# Patient Record
Sex: FEMALE | Race: BLACK OR AFRICAN AMERICAN | NOT HISPANIC OR LATINO | Employment: FULL TIME | RURAL
[De-identification: names, ages, dates, MRNs, and addresses within clinical notes are randomized per-mention and may not be internally consistent; named-entity substitution may affect disease eponyms.]

---

## 2020-07-27 ENCOUNTER — TELEPHONE (OUTPATIENT)
Dept: FAMILY MEDICINE CLINIC | Facility: CLINIC | Age: 25
End: 2020-07-27

## 2020-07-27 NOTE — TELEPHONE ENCOUNTER
CALLED PATIENT TO R/S JOSÉ LUIS IS OUT NEED TO R/S CAN SCHEDULE WITH [facility] FOR NEXT WEEK  HUB CAN READ

## 2020-08-18 ENCOUNTER — OFFICE VISIT (OUTPATIENT)
Dept: FAMILY MEDICINE CLINIC | Facility: CLINIC | Age: 25
End: 2020-08-18

## 2020-08-18 ENCOUNTER — LAB (OUTPATIENT)
Dept: LAB | Facility: HOSPITAL | Age: 25
End: 2020-08-18

## 2020-08-18 VITALS
SYSTOLIC BLOOD PRESSURE: 123 MMHG | HEIGHT: 67 IN | WEIGHT: 279 LBS | HEART RATE: 76 BPM | RESPIRATION RATE: 20 BRPM | OXYGEN SATURATION: 98 % | BODY MASS INDEX: 43.79 KG/M2 | DIASTOLIC BLOOD PRESSURE: 71 MMHG | TEMPERATURE: 98 F

## 2020-08-18 DIAGNOSIS — Z13.21 ENCOUNTER FOR VITAMIN DEFICIENCY SCREENING: ICD-10-CM

## 2020-08-18 DIAGNOSIS — Z13.1 SCREENING FOR DIABETES MELLITUS: ICD-10-CM

## 2020-08-18 DIAGNOSIS — L02.91 ABSCESS: Primary | ICD-10-CM

## 2020-08-18 DIAGNOSIS — Z13.29 SCREENING FOR THYROID DISORDER: ICD-10-CM

## 2020-08-18 PROCEDURE — 99213 OFFICE O/P EST LOW 20 MIN: CPT | Performed by: NURSE PRACTITIONER

## 2020-08-18 PROCEDURE — 84443 ASSAY THYROID STIM HORMONE: CPT | Performed by: NURSE PRACTITIONER

## 2020-08-18 PROCEDURE — 82306 VITAMIN D 25 HYDROXY: CPT | Performed by: NURSE PRACTITIONER

## 2020-08-18 PROCEDURE — 80053 COMPREHEN METABOLIC PANEL: CPT | Performed by: NURSE PRACTITIONER

## 2020-08-18 RX ORDER — SULFAMETHOXAZOLE AND TRIMETHOPRIM 800; 160 MG/1; MG/1
1 TABLET ORAL 2 TIMES DAILY
Qty: 20 TABLET | Refills: 0 | Status: SHIPPED | OUTPATIENT
Start: 2020-08-18 | End: 2020-09-10

## 2020-08-18 NOTE — PATIENT INSTRUCTIONS
Calorie Counting for Weight Loss  Calories are units of energy. Your body needs a certain amount of calories from food to keep you going throughout the day. When you eat more calories than your body needs, your body stores the extra calories as fat. When you eat fewer calories than your body needs, your body burns fat to get the energy it needs.  Calorie counting means keeping track of how many calories you eat and drink each day. Calorie counting can be helpful if you need to lose weight. If you make sure to eat fewer calories than your body needs, you should lose weight. Ask your health care provider what a healthy weight is for you.  For calorie counting to work, you will need to eat the right number of calories in a day in order to lose a healthy amount of weight per week. A dietitian can help you determine how many calories you need in a day and will give you suggestions on how to reach your calorie goal.  · A healthy amount of weight to lose per week is usually 1-2 lb (0.5-0.9 kg). This usually means that your daily calorie intake should be reduced by 500-750 calories.  · Eating 1,200 - 1,500 calories per day can help most women lose weight.  · Eating 1,500 - 1,800 calories per day can help most men lose weight.  What is my plan?  My goal is to have __________ calories per day.  If I have this many calories per day, I should lose around __________ pounds per week.  What do I need to know about calorie counting?  In order to meet your daily calorie goal, you will need to:  · Find out how many calories are in each food you would like to eat. Try to do this before you eat.  · Decide how much of the food you plan to eat.  · Write down what you ate and how many calories it had. Doing this is called keeping a food log.  To successfully lose weight, it is important to balance calorie counting with a healthy lifestyle that includes regular activity. Aim for 150 minutes of moderate exercise (such as walking) or 75  minutes of vigorous exercise (such as running) each week.  Where do I find calorie information?    The number of calories in a food can be found on a Nutrition Facts label. If a food does not have a Nutrition Facts label, try to look up the calories online or ask your dietitian for help.  Remember that calories are listed per serving. If you choose to have more than one serving of a food, you will have to multiply the calories per serving by the amount of servings you plan to eat. For example, the label on a package of bread might say that a serving size is 1 slice and that there are 90 calories in a serving. If you eat 1 slice, you will have eaten 90 calories. If you eat 2 slices, you will have eaten 180 calories.  How do I keep a food log?  Immediately after each meal, record the following information in your food log:  · What you ate. Don't forget to include toppings, sauces, and other extras on the food.  · How much you ate. This can be measured in cups, ounces, or number of items.  · How many calories each food and drink had.  · The total number of calories in the meal.  Keep your food log near you, such as in a small notebook in your pocket, or use a mobile justine or website. Some programs will calculate calories for you and show you how many calories you have left for the day to meet your goal.  What are some calorie counting tips?    · Use your calories on foods and drinks that will fill you up and not leave you hungry:  ? Some examples of foods that fill you up are nuts and nut butters, vegetables, lean proteins, and high-fiber foods like whole grains. High-fiber foods are foods with more than 5 g fiber per serving.  ? Drinks such as sodas, specialty coffee drinks, alcohol, and juices have a lot of calories, yet do not fill you up.  · Eat nutritious foods and avoid empty calories. Empty calories are calories you get from foods or beverages that do not have many vitamins or protein, such as candy, sweets, and  "soda. It is better to have a nutritious high-calorie food (such as an avocado) than a food with few nutrients (such as a bag of chips).  · Know how many calories are in the foods you eat most often. This will help you calculate calorie counts faster.  · Pay attention to calories in drinks. Low-calorie drinks include water and unsweetened drinks.  · Pay attention to nutrition labels for \"low fat\" or \"fat free\" foods. These foods sometimes have the same amount of calories or more calories than the full fat versions. They also often have added sugar, starch, or salt, to make up for flavor that was removed with the fat.  · Find a way of tracking calories that works for you. Get creative. Try different apps or programs if writing down calories does not work for you.  What are some portion control tips?  · Know how many calories are in a serving. This will help you know how many servings of a certain food you can have.  · Use a measuring cup to measure serving sizes. You could also try weighing out portions on a kitchen scale. With time, you will be able to estimate serving sizes for some foods.  · Take some time to put servings of different foods on your favorite plates, bowls, and cups so you know what a serving looks like.  · Try not to eat straight from a bag or box. Doing this can lead to overeating. Put the amount you would like to eat in a cup or on a plate to make sure you are eating the right portion.  · Use smaller plates, glasses, and bowls to prevent overeating.  · Try not to multitask (for example, watch TV or use your computer) while eating. If it is time to eat, sit down at a table and enjoy your food. This will help you to know when you are full. It will also help you to be aware of what you are eating and how much you are eating.  What are tips for following this plan?  Reading food labels  · Check the calorie count compared to the serving size. The serving size may be smaller than what you are used to " "eating.  · Check the source of the calories. Make sure the food you are eating is high in vitamins and protein and low in saturated and trans fats.  Shopping  · Read nutrition labels while you shop. This will help you make healthy decisions before you decide to purchase your food.  · Make a grocery list and stick to it.  Cooking  · Try to cook your favorite foods in a healthier way. For example, try baking instead of frying.  · Use low-fat dairy products.  Meal planning  · Use more fruits and vegetables. Half of your plate should be fruits and vegetables.  · Include lean proteins like poultry and fish.  How do I count calories when eating out?  · Ask for smaller portion sizes.  · Consider sharing an entree and sides instead of getting your own entree.  · If you get your own entree, eat only half. Ask for a box at the beginning of your meal and put the rest of your entree in it so you are not tempted to eat it.  · If calories are listed on the menu, choose the lower calorie options.  · Choose dishes that include vegetables, fruits, whole grains, low-fat dairy products, and lean protein.  · Choose items that are boiled, broiled, grilled, or steamed. Stay away from items that are buttered, battered, fried, or served with cream sauce. Items labeled \"crispy\" are usually fried, unless stated otherwise.  · Choose water, low-fat milk, unsweetened iced tea, or other drinks without added sugar. If you want an alcoholic beverage, choose a lower calorie option such as a glass of wine or light beer.  · Ask for dressings, sauces, and syrups on the side. These are usually high in calories, so you should limit the amount you eat.  · If you want a salad, choose a garden salad and ask for grilled meats. Avoid extra toppings like mae, cheese, or fried items. Ask for the dressing on the side, or ask for olive oil and vinegar or lemon to use as dressing.  · Estimate how many servings of a food you are given. For example, a serving of " cooked rice is ½ cup or about the size of half a baseball. Knowing serving sizes will help you be aware of how much food you are eating at restaurants. The list below tells you how big or small some common portion sizes are based on everyday objects:  ? 1 oz--4 stacked dice.  ? 3 oz--1 deck of cards.  ? 1 tsp--1 die.  ? 1 Tbsp--½ a ping-pong ball.  ? 2 Tbsp--1 ping-pong ball.  ? ½ cup--½ baseball.  ? 1 cup--1 baseball.  Summary  · Calorie counting means keeping track of how many calories you eat and drink each day. If you eat fewer calories than your body needs, you should lose weight.  · A healthy amount of weight to lose per week is usually 1-2 lb (0.5-0.9 kg). This usually means reducing your daily calorie intake by 500-750 calories.  · The number of calories in a food can be found on a Nutrition Facts label. If a food does not have a Nutrition Facts label, try to look up the calories online or ask your dietitian for help.  · Use your calories on foods and drinks that will fill you up, and not on foods and drinks that will leave you hungry.  · Use smaller plates, glasses, and bowls to prevent overeating.  This information is not intended to replace advice given to you by your health care provider. Make sure you discuss any questions you have with your health care provider.  Document Released: 12/18/2006 Document Revised: 09/06/2019 Document Reviewed: 11/17/2017  [employer] Patient Education © 2020 [employer]      Exercising to Lose Weight  Exercise is structured, repetitive physical activity to improve fitness and health. Getting regular exercise is important for everyone. It is especially important if you are overweight. Being overweight increases your risk of heart disease, stroke, diabetes, high blood pressure, and several types of cancer. Reducing your calorie intake and exercising can help you lose weight.  Exercise is usually categorized as moderate or vigorous intensity. To lose weight, most people need  to do a certain amount of moderate-intensity or vigorous-intensity exercise each week.  Moderate-intensity exercise    Moderate-intensity exercise is any activity that gets you moving enough to burn at least three times more energy (calories) than if you were sitting.  Examples of moderate exercise include:  · Walking a mile in 15 minutes.  · Doing light yard work.  · Biking at an easy pace.  Most people should get at least 150 minutes (2 hours and 30 minutes) a week of moderate-intensity exercise to maintain their body weight.  Vigorous-intensity exercise  Vigorous-intensity exercise is any activity that gets you moving enough to burn at least six times more calories than if you were sitting. When you exercise at this intensity, you should be working hard enough that you are not able to carry on a conversation.  Examples of vigorous exercise include:  · Running.  · Playing a team sport, such as football, basketball, and soccer.  · Jumping rope.  Most people should get at least 75 minutes (1 hour and 15 minutes) a week of vigorous-intensity exercise to maintain their body weight.  How can exercise affect me?  When you exercise enough to burn more calories than you eat, you lose weight. Exercise also reduces body fat and builds muscle. The more muscle you have, the more calories you burn. Exercise also:  · Improves mood.  · Reduces stress and tension.  · Improves your overall fitness, flexibility, and endurance.  · Increases bone strength.  The amount of exercise you need to lose weight depends on:  · Your age.  · The type of exercise.  · Any health conditions you have.  · Your overall physical ability.  Talk to your health care provider about how much exercise you need and what types of activities are safe for you.  What actions can I take to lose weight?  Nutrition    · Make changes to your diet as told by your health care provider or diet and nutrition specialist (dietitian). This may include:  ? Eating fewer  calories.  ? Eating more protein.  ? Eating less unhealthy fats.  ? Eating a diet that includes fresh fruits and vegetables, whole grains, low-fat dairy products, and lean protein.  ? Avoiding foods with added fat, salt, and sugar.  · Drink plenty of water while you exercise to prevent dehydration or heat stroke.  Activity  · Choose an activity that you enjoy and set realistic goals. Your health care provider can help you make an exercise plan that works for you.  · Exercise at a moderate or vigorous intensity most days of the week.  ? The intensity of exercise may vary from person to person. You can tell how intense a workout is for you by paying attention to your breathing and heartbeat. Most people will notice their breathing and heartbeat get faster with more intense exercise.  · Do resistance training twice each week, such as:  ? Push-ups.  ? Sit-ups.  ? Lifting weights.  ? Using resistance bands.  · Getting short amounts of exercise can be just as helpful as long structured periods of exercise. If you have trouble finding time to exercise, try to include exercise in your daily routine.  ? Get up, stretch, and walk around every 30 minutes throughout the day.  ? Go for a walk during your lunch break.  ? Park your car farther away from your destination.  ? If you take public transportation, get off one stop early and walk the rest of the way.  ? Make phone calls while standing up and walking around.  ? Take the stairs instead of elevators or escalators.  · Wear comfortable clothes and shoes with good support.  · Do not exercise so much that you hurt yourself, feel dizzy, or get very short of breath.  Where to find more information  · [place] Department of Health and Human Services: www.hhs.gov  · Centers for Disease Control and Prevention (CDC): www.cdc.gov  Contact a health care provider:  · Before starting a new exercise program.  · If you have questions or concerns about your weight.  · If you have a medical  problem that keeps you from exercising.  Get help right away if you have any of the following while exercising:  · Injury.  · Dizziness.  · Difficulty breathing or shortness of breath that does not go away when you stop exercising.  · Chest pain.  · Rapid heartbeat.  Summary  · Being overweight increases your risk of heart disease, stroke, diabetes, high blood pressure, and several types of cancer.  · Losing weight happens when you burn more calories than you eat.  · Reducing the amount of calories you eat in addition to getting regular moderate or vigorous exercise each week helps you lose weight.  This information is not intended to replace advice given to you by your health care provider. Make sure you discuss any questions you have with your health care provider.  Document Released: 01/20/2012 Document Revised: 12/31/2018 Document Reviewed: 12/31/2018  [employer] Patient Education © 2020 [employer]

## 2020-08-18 NOTE — PROGRESS NOTES
Subjective   Osiel Camargo is a 25 y.o. female.     Jamal Camargo age 25 comes in today wanting to have a lab work drawn.  She states there is a family history of diabetes and hypertension.  At this time she does not take any medications.  Her last baby was born 1 year ago and her Pap was done at that point it was normal.  She wishes to go back to the OB/GYN to have her Pap smears done.  She also has recurrent boils on her thighs currently she has one on the left thigh.  She is asking for an antibiotic for this.    Rash   This is a recurrent problem. The current episode started in the past 7 days. The problem is unchanged. Location: Left inner thigh. The rash is characterized by redness and pain. She was exposed to nothing. Pertinent negatives include no anorexia, congestion, cough, diarrhea, eye pain, facial edema, fatigue, fever, joint pain, nail changes, rhinorrhea, shortness of breath, sore throat or vomiting. Past treatments include nothing. The treatment provided no relief.        The following portions of the patient's history were reviewed and updated as appropriate: allergies, current medications, past family history, past medical history, past social history, past surgical history and problem list.    Review of Systems   Constitutional: Negative.  Negative for fatigue and fever.   HENT: Negative.  Negative for congestion, rhinorrhea and sore throat.    Eyes: Negative.  Negative for pain.   Respiratory: Negative.  Negative for cough and shortness of breath.    Cardiovascular: Negative.    Gastrointestinal: Negative.  Negative for anorexia, diarrhea and vomiting.   Endocrine: Negative.    Genitourinary: Negative.    Musculoskeletal: Negative.  Negative for joint pain.   Skin: Positive for rash. Negative for nail changes.        Current abscess in the groin and thigh areas   Allergic/Immunologic: Negative.    Neurological: Negative.    Hematological: Negative.    Psychiatric/Behavioral: Negative.         Objective   Physical Exam   Constitutional: She is oriented to person, place, and time. She appears well-developed and well-nourished. No distress.   HENT:   Head: Normocephalic and atraumatic.   Right Ear: External ear normal.   Left Ear: External ear normal.   Nose: Nose normal.   Mouth/Throat: Oropharynx is clear and moist. No oropharyngeal exudate.   Eyes: Pupils are equal, round, and reactive to light.   Neck: Normal range of motion. Neck supple. No thyromegaly present.   Cardiovascular: Normal rate, regular rhythm and normal heart sounds. Exam reveals no friction rub.   No murmur heard.  Pulmonary/Chest: Effort normal and breath sounds normal. No respiratory distress. She has no wheezes. She has no rales.   Abdominal: Soft.   Musculoskeletal: Normal range of motion.   Neurological: She is alert and oriented to person, place, and time.   Skin: Skin is warm and dry.   Third she would not let me look at the abscess today.   Psychiatric: She has a normal mood and affect. Thought content normal.   Nursing note and vitals reviewed.        Assessment/Plan   Osiel was seen today for establish care, anxiety and annual exam.    Diagnoses and all orders for this visit:    Abscess  -     sulfamethoxazole-trimethoprim (BACTRIM DS,SEPTRA DS) 800-160 MG per tablet; Take 1 tablet by mouth 2 (Two) Times a Day.    Encounter for vitamin deficiency screening  -     Vitamin D 25 Hydroxy    Screening for thyroid disorder  -     TSH    Screening for diabetes mellitus  -     Comprehensive metabolic panel      Her BMI is noted to be 43.7 which is considered morbid obesity.  Diet and exercise information will be provided to this patient.

## 2020-08-19 LAB
25(OH)D3 SERPL-MCNC: 14.5 NG/ML (ref 30–100)
ALBUMIN SERPL-MCNC: 4.3 G/DL (ref 3.5–5.2)
ALBUMIN/GLOB SERPL: 1.3 G/DL
ALP SERPL-CCNC: 70 U/L (ref 39–117)
ALT SERPL W P-5'-P-CCNC: 14 U/L (ref 1–33)
ANION GAP SERPL CALCULATED.3IONS-SCNC: 11.8 MMOL/L (ref 5–15)
AST SERPL-CCNC: 15 U/L (ref 1–32)
BILIRUB SERPL-MCNC: 0.2 MG/DL (ref 0–1.2)
BUN SERPL-MCNC: 9 MG/DL (ref 6–20)
BUN/CREAT SERPL: 12 (ref 7–25)
CALCIUM SPEC-SCNC: 9.2 MG/DL (ref 8.6–10.5)
CHLORIDE SERPL-SCNC: 103 MMOL/L (ref 98–107)
CO2 SERPL-SCNC: 25.2 MMOL/L (ref 22–29)
CREAT SERPL-MCNC: 0.75 MG/DL (ref 0.57–1)
GFR SERPL CREATININE-BSD FRML MDRD: 115 ML/MIN/1.73
GLOBULIN UR ELPH-MCNC: 3.4 GM/DL
GLUCOSE SERPL-MCNC: 91 MG/DL (ref 65–99)
POTASSIUM SERPL-SCNC: 4 MMOL/L (ref 3.5–5.2)
PROT SERPL-MCNC: 7.7 G/DL (ref 6–8.5)
SODIUM SERPL-SCNC: 140 MMOL/L (ref 136–145)
TSH SERPL DL<=0.05 MIU/L-ACNC: 0.34 UIU/ML (ref 0.27–4.2)

## 2020-08-20 ENCOUNTER — TELEPHONE (OUTPATIENT)
Dept: FAMILY MEDICINE CLINIC | Facility: CLINIC | Age: 25
End: 2020-08-20

## 2020-08-20 DIAGNOSIS — E55.9 VITAMIN D DEFICIENCY: Primary | ICD-10-CM

## 2020-08-20 RX ORDER — ERGOCALCIFEROL 1.25 MG/1
50000 CAPSULE ORAL WEEKLY
Qty: 5 CAPSULE | Refills: 3 | Status: SHIPPED | OUTPATIENT
Start: 2020-08-20 | End: 2021-02-08 | Stop reason: SDUPTHER

## 2020-08-20 NOTE — PROGRESS NOTES
let her know that everything is normal except for her vitamin D and it is very low she needs to start 50,000 units weekly and we need to recheck in 6 weeks.

## 2020-08-20 NOTE — TELEPHONE ENCOUNTER
----- Message from CAMPBELL Key sent at 8/20/2020  7:51 AM CDT -----  let her know that everything is normal except for her vitamin D and it is very low she needs to start 50,000 units weekly and we need to recheck in 6 weeks.

## 2020-09-04 ENCOUNTER — TELEPHONE (OUTPATIENT)
Dept: FAMILY MEDICINE CLINIC | Facility: CLINIC | Age: 25
End: 2020-09-04

## 2020-09-04 DIAGNOSIS — J01.00 ACUTE MAXILLARY SINUSITIS, RECURRENCE NOT SPECIFIED: Primary | ICD-10-CM

## 2020-09-04 RX ORDER — METHYLPREDNISOLONE 4 MG/1
TABLET ORAL
Qty: 21 EACH | Refills: 0 | Status: SHIPPED | OUTPATIENT
Start: 2020-09-04 | End: 2020-09-10 | Stop reason: ALTCHOICE

## 2020-09-04 RX ORDER — AMOXICILLIN AND CLAVULANATE POTASSIUM 875; 125 MG/1; MG/1
1 TABLET, FILM COATED ORAL 2 TIMES DAILY
Qty: 20 TABLET | Refills: 0 | Status: SHIPPED | OUTPATIENT
Start: 2020-09-04 | End: 2020-10-07

## 2020-09-04 NOTE — TELEPHONE ENCOUNTER
PATIENT STATES SHE IS HAVING SINUS PRESSURE AND IS FEELING LIGHT HEADED STILL SENSE BEING SEEN. PATIENT IS REQUESTING A CALL BACK TO DISCUSS GETTING MEDICATION CALLED IN.

## 2020-09-10 ENCOUNTER — OFFICE VISIT (OUTPATIENT)
Dept: FAMILY MEDICINE CLINIC | Facility: CLINIC | Age: 25
End: 2020-09-10

## 2020-09-10 VITALS
TEMPERATURE: 98.4 F | HEART RATE: 75 BPM | DIASTOLIC BLOOD PRESSURE: 78 MMHG | SYSTOLIC BLOOD PRESSURE: 116 MMHG | HEIGHT: 67 IN | OXYGEN SATURATION: 97 % | WEIGHT: 274.38 LBS | BODY MASS INDEX: 43.07 KG/M2 | RESPIRATION RATE: 20 BRPM

## 2020-09-10 DIAGNOSIS — J01.90 ACUTE SINUSITIS, RECURRENCE NOT SPECIFIED, UNSPECIFIED LOCATION: Primary | ICD-10-CM

## 2020-09-10 DIAGNOSIS — E66.01 CLASS 3 SEVERE OBESITY WITH BODY MASS INDEX (BMI) OF 40.0 TO 44.9 IN ADULT, UNSPECIFIED OBESITY TYPE, UNSPECIFIED WHETHER SERIOUS COMORBIDITY PRESENT (HCC): ICD-10-CM

## 2020-09-10 DIAGNOSIS — J30.2 SEASONAL ALLERGIES: ICD-10-CM

## 2020-09-10 DIAGNOSIS — R06.00 DYSPNEA, UNSPECIFIED TYPE: ICD-10-CM

## 2020-09-10 PROCEDURE — 99214 OFFICE O/P EST MOD 30 MIN: CPT | Performed by: NURSE PRACTITIONER

## 2020-09-10 RX ORDER — FLUTICASONE PROPIONATE 50 MCG
2 SPRAY, SUSPENSION (ML) NASAL DAILY
Qty: 16 G | Refills: 2 | Status: SHIPPED | OUTPATIENT
Start: 2020-09-10 | End: 2020-12-01 | Stop reason: SDUPTHER

## 2020-09-10 RX ORDER — CETIRIZINE HYDROCHLORIDE 10 MG/1
10 TABLET ORAL DAILY
Qty: 30 TABLET | Refills: 2 | Status: SHIPPED | OUTPATIENT
Start: 2020-09-10 | End: 2020-12-01 | Stop reason: SDUPTHER

## 2020-09-10 RX ORDER — FLUCONAZOLE 150 MG/1
TABLET ORAL
Qty: 2 TABLET | Refills: 0 | Status: SHIPPED | OUTPATIENT
Start: 2020-09-10 | End: 2020-10-07

## 2020-09-10 NOTE — PATIENT INSTRUCTIONS
Allergic Rhinitis, Adult  Allergic rhinitis is an allergic reaction that affects the mucous membrane inside the nose. It causes sneezing, a runny or stuffy nose, and the feeling of mucus going down the back of the throat (postnasal drip). Allergic rhinitis can be mild to severe.  There are two types of allergic rhinitis:  · Seasonal. This type is also called hay fever. It happens only during certain seasons.  · Perennial. This type can happen at any time of the year.  What are the causes?  This condition happens when the body's defense system (immune system) responds to certain harmless substances called allergens as though they were germs.   Seasonal allergic rhinitis is triggered by pollen, which can come from grasses, trees, and weeds. Perennial allergic rhinitis may be caused by:  · House dust mites.  · Pet dander.  · Mold spores.  What are the signs or symptoms?  Symptoms of this condition include:  · Sneezing.  · Runny or stuffy nose (nasal congestion).  · Postnasal drip.  · Itchy nose.  · Tearing of the eyes.  · Trouble sleeping.  · Daytime sleepiness.  How is this diagnosed?  This condition may be diagnosed based on:  · Your medical history.  · A physical exam.  · Tests to check for related conditions, such as:  ? Asthma.  ? Pink eye.  ? Ear infection.  ? Upper respiratory infection.  · Tests to find out which allergens trigger your symptoms. These may include skin or blood tests.  How is this treated?  There is no cure for this condition, but treatment can help control symptoms. Treatment may include:  · Taking medicines that block allergy symptoms, such as antihistamines. Medicine may be given as a shot, nasal spray, or pill.  · Avoiding the allergen.  · Desensitization. This treatment involves getting ongoing shots until your body becomes less sensitive to the allergen. This treatment may be done if other treatments do not help.  · If taking medicine and avoiding the allergen does not work, new, stronger  medicines may be prescribed.  Follow these instructions at home:  · Find out what you are allergic to. Common allergens include smoke, dust, and pollen.  · Avoid the things you are allergic to. These are some things you can do to help avoid allergens:  ? Replace carpet with wood, tile, or vinyl aria. Carpet can trap dander and dust.  ? Do not smoke. Do not allow smoking in your home.  ? Change your heating and air conditioning filter at least once a month.  ? During allergy season:  § Keep windows closed as much as possible.  § Plan outdoor activities when pollen counts are lowest. This is usually during the evening hours.  § When coming indoors, change clothing and shower before sitting on furniture or bedding.  · Take over-the-counter and prescription medicines only as told by your health care provider.  · Keep all follow-up visits as told by your health care provider. This is important.  Contact a health care provider if:  · You have a fever.  · You develop a persistent cough.  · You make whistling sounds when you breathe (you wheeze).  · Your symptoms interfere with your normal daily activities.  Get help right away if:  · You have shortness of breath.  Summary  · This condition can be managed by taking medicines as directed and avoiding allergens.  · Contact your health care provider if you develop a persistent cough or fever.  · During allergy season, keep windows closed as much as possible.  This information is not intended to replace advice given to you by your health care provider. Make sure you discuss any questions you have with your health care provider.  Document Released: 09/12/2002 Document Revised: 11/30/2018 Document Reviewed: 01/25/2018  Elsevier Patient Education © 2020 [employer]      Sinusitis, Adult  Sinusitis is inflammation of your sinuses. Sinuses are hollow spaces in the bones around your face. Your sinuses are located:  · Around your eyes.  · In the middle of your  forehead.  · Behind your nose.  · In your cheekbones.  Mucus normally drains out of your sinuses. When your nasal tissues become inflamed or swollen, mucus can become trapped or blocked. This allows bacteria, viruses, and fungi to grow, which leads to infection. Most infections of the sinuses are caused by a virus.  Sinusitis can develop quickly. It can last for up to 4 weeks (acute) or for more than 12 weeks (chronic). Sinusitis often develops after a cold.  What are the causes?  This condition is caused by anything that creates swelling in the sinuses or stops mucus from draining. This includes:  · Allergies.  · Asthma.  · Infection from bacteria or viruses.  · Deformities or blockages in your nose or sinuses.  · Abnormal growths in the nose (nasal polyps).  · Pollutants, such as chemicals or irritants in the air.  · Infection from fungi (rare).  What increases the risk?  You are more likely to develop this condition if you:  · Have a weak body defense system (immune system).  · Do a lot of swimming or diving.  · Overuse nasal sprays.  · Smoke.  What are the signs or symptoms?  The main symptoms of this condition are pain and a feeling of pressure around the affected sinuses. Other symptoms include:  · Stuffy nose or congestion.  · Thick drainage from your nose.  · Swelling and warmth over the affected sinuses.  · Headache.  · Upper toothache.  · A cough that may get worse at night.  · Extra mucus that collects in the throat or the back of the nose (postnasal drip).  · Decreased sense of smell and taste.  · Fatigue.  · A fever.  · Sore throat.  · Bad breath.  How is this diagnosed?  This condition is diagnosed based on:  · Your symptoms.  · Your medical history.  · A physical exam.  · Tests to find out if your condition is acute or chronic. This may include:  ? Checking your nose for nasal polyps.  ? Viewing your sinuses using a device that has a light (endoscope).  ? Testing for allergies or bacteria.  ? Imaging  tests, such as an MRI or CT scan.  In rare cases, a bone biopsy may be done to rule out more serious types of fungal sinus disease.  How is this treated?  Treatment for sinusitis depends on the cause and whether your condition is chronic or acute.  · If caused by a virus, your symptoms should go away on their own within 10 days. You may be given medicines to relieve symptoms. They include:  ? Medicines that shrink swollen nasal passages (topical intranasal decongestants).  ? Medicines that treat allergies (antihistamines).  ? A spray that eases inflammation of the nostrils (topical intranasal corticosteroids).  ? Rinses that help get rid of thick mucus in your nose (nasal saline washes).  · If caused by bacteria, your health care provider may recommend waiting to see if your symptoms improve. Most bacterial infections will get better without antibiotic medicine. You may be given antibiotics if you have:  ? A severe infection.  ? A weak immune system.  · If caused by narrow nasal passages or nasal polyps, you may need to have surgery.  Follow these instructions at home:  Medicines  · Take, use, or apply over-the-counter and prescription medicines only as told by your health care provider. These may include nasal sprays.  · If you were prescribed an antibiotic medicine, take it as told by your health care provider. Do not stop taking the antibiotic even if you start to feel better.  Hydrate and humidify    · Drink enough fluid to keep your urine pale yellow. Staying hydrated will help to thin your mucus.  · Use a cool mist humidifier to keep the humidity level in your home above 50%.  · Inhale steam for 10-15 minutes, 3-4 times a day, or as told by your health care provider. You can do this in the bathroom while a hot shower is running.  · Limit your exposure to cool or dry air.  Rest  · Rest as much as possible.  · Sleep with your head raised (elevated).  · Make sure you get enough sleep each night.  General  instructions    · Apply a warm, moist washcloth to your face 3-4 times a day or as told by your health care provider. This will help with discomfort.  · Wash your hands often with soap and water to reduce your exposure to germs. If soap and water are not available, use hand .  · Do not smoke. Avoid being around people who are smoking (secondhand smoke).  · Keep all follow-up visits as told by your health care provider. This is important.  Contact a health care provider if:  · You have a fever.  · Your symptoms get worse.  · Your symptoms do not improve within 10 days.  Get help right away if:  · You have a severe headache.  · You have persistent vomiting.  · You have severe pain or swelling around your face or eyes.  · You have vision problems.  · You develop confusion.  · Your neck is stiff.  · You have trouble breathing.  Summary  · Sinusitis is soreness and inflammation of your sinuses. Sinuses are hollow spaces in the bones around your face.  · This condition is caused by nasal tissues that become inflamed or swollen. The swelling traps or blocks the flow of mucus. This allows bacteria, viruses, and fungi to grow, which leads to infection.  · If you were prescribed an antibiotic medicine, take it as told by your health care provider. Do not stop taking the antibiotic even if you start to feel better.  · Keep all follow-up visits as told by your health care provider. This is important.  This information is not intended to replace advice given to you by your health care provider. Make sure you discuss any questions you have with your health care provider.  Document Released: 12/18/2006 Document Revised: 05/20/2019 Document Reviewed: 05/20/2019  [employer] Patient Education © 2020 [employer] Inc[employer]

## 2020-09-10 NOTE — PROGRESS NOTES
"Subjective   Osiel Camargo is a 25 y.o. female.     [facility] Same Day Appointment    PCP: CAMPBELL Gilbert    CC: \"sinus infection worse\"    Sinusitis   This is a new problem. Episode onset: x 2-3 weeks. The problem has been waxing and waning since onset. There has been no fever. She is experiencing no pain (mostly pressure). Associated symptoms include congestion, headaches, shortness of breath (at times -- reports lungs feel \"empty\") and sinus pressure (worse with bending forward). Pertinent negatives include no chills, coughing, diaphoresis, ear pain, hoarse voice, neck pain, sneezing, sore throat or swollen glands. Treatments tried: PCP called in Augmentin, Medrol, Claritin D on 9/4--only took 1 sudafed due to side effects; randomly took Medrol, didn't complete; has only taken total of two Augmentin pills, one first day and one yesterday. The treatment provided no relief.        The following portions of the patient's history were reviewed and updated as appropriate: allergies, current medications, past medical history, past social history, past surgical history and problem list.    Review of Systems   Constitutional: Negative for appetite change, chills, diaphoresis and fever.   HENT: Positive for congestion and sinus pressure (worse with bending forward). Negative for ear discharge, ear pain, hoarse voice, postnasal drip, rhinorrhea, sneezing, sore throat, swollen glands and trouble swallowing. Dental problem:  teeth hurt in sinus area bilaterally, worse on right side.    Eyes: Negative.    Respiratory: Positive for shortness of breath (at times -- reports lungs feel \"empty\"). Negative for cough, chest tightness and wheezing.    Cardiovascular: Negative.    Gastrointestinal: Negative.    Genitourinary: Negative for difficulty urinating.   Musculoskeletal: Negative for neck pain.   Skin: Negative.    Neurological: Positive for headache. Negative for dizziness.     /78 (BP Location: Right arm, Patient Position: " "Sitting, Cuff Size: Large Adult)   Pulse 75   Temp 98.4 °F (36.9 °C) (Temporal)   Resp 20   Ht 170.2 cm (67\")   Wt 124 kg (274 lb 6 oz)   SpO2 97%   BMI 42.97 kg/m²     Objective   Physical Exam   Constitutional: She is oriented to person, place, and time. She appears well-developed and well-nourished. No distress.   HENT:   Head: Normocephalic and atraumatic.   Right Ear: Tympanic membrane and ear canal normal.   Left Ear: Tympanic membrane and ear canal normal.   Nose: Mucosal edema (pale, boggy) present. No congestion. Right sinus exhibits maxillary sinus tenderness and frontal sinus tenderness. Left sinus exhibits maxillary sinus tenderness and frontal sinus tenderness.   Mouth/Throat: Uvula is midline, oropharynx is clear and moist and mucous membranes are normal.   Eyes: Conjunctivae are normal. Right eye exhibits no discharge. Left eye exhibits no discharge.   Neck: Neck supple.   Cardiovascular: Normal rate and regular rhythm.   Pulmonary/Chest: Effort normal. She has no wheezes. She has no rales.   Minimally congested cough noted   Lymphadenopathy:     She has no cervical adenopathy.   Neurological: She is alert and oriented to person, place, and time.   Nursing note and vitals reviewed.    No results found for this or any previous visit (from the past 24 hour(s)).  No Images in the past 120 days found..      Assessment/Plan   Osiel was seen today for sinus problem.    Diagnoses and all orders for this visit:    Acute sinusitis, recurrence not specified, unspecified location  -     XR Sinuses 3+ View (In Office)    Dyspnea, unspecified type  -     XR Chest PA & Lateral (In Office)    Seasonal allergies  -     cetirizine (zyrTEC) 10 MG tablet; Take 1 tablet by mouth Daily.  -     fluticasone (Flonase) 50 MCG/ACT nasal spray; 2 sprays into the nostril(s) as directed by provider Daily.    Class 3 severe obesity with body mass index (BMI) of 40.0 to 44.9 in adult, unspecified obesity type, unspecified " whether serious comorbidity present (CMS/HCC)      CXR/Sinus xrays today--will call with results when available    Finish Augmentin to completion--Rx for Diflucan PRN yeast from antibiotics at patient request  D/C Medrol, Claritin D due to side effects not being tolerated by patient  Rx for Zyrtec, Flonase provided   Declines Rocephin or steroid injection at this time  Cool mist humidifier at night recommended    Patient's Body mass index is 42.97 kg/m². BMI is above normal parameters. Recommendations include: referral to primary care.    See PCP or RTC if symptoms persist/worsen  See PCP for routine f/u visit and management of chronic medical conditions      This document has been electronically signed by CAMPBELL Hernadez on September 10, 2020 09:12,.

## 2020-10-07 ENCOUNTER — OFFICE VISIT (OUTPATIENT)
Dept: FAMILY MEDICINE CLINIC | Facility: CLINIC | Age: 25
End: 2020-10-07

## 2020-10-07 ENCOUNTER — LAB (OUTPATIENT)
Dept: LAB | Facility: HOSPITAL | Age: 25
End: 2020-10-07

## 2020-10-07 VITALS
SYSTOLIC BLOOD PRESSURE: 110 MMHG | TEMPERATURE: 97.3 F | DIASTOLIC BLOOD PRESSURE: 70 MMHG | BODY MASS INDEX: 43.85 KG/M2 | HEART RATE: 62 BPM | WEIGHT: 279.38 LBS | OXYGEN SATURATION: 99 % | RESPIRATION RATE: 20 BRPM | HEIGHT: 67 IN

## 2020-10-07 DIAGNOSIS — R53.83 OTHER FATIGUE: ICD-10-CM

## 2020-10-07 DIAGNOSIS — F41.9 ANXIETY: Primary | ICD-10-CM

## 2020-10-07 LAB — HGB BLDA-MCNC: 10.9 G/DL (ref 12–17)

## 2020-10-07 PROCEDURE — 83540 ASSAY OF IRON: CPT | Performed by: NURSE PRACTITIONER

## 2020-10-07 PROCEDURE — 82607 VITAMIN B-12: CPT | Performed by: NURSE PRACTITIONER

## 2020-10-07 PROCEDURE — 82746 ASSAY OF FOLIC ACID SERUM: CPT | Performed by: NURSE PRACTITIONER

## 2020-10-07 PROCEDURE — 85027 COMPLETE CBC AUTOMATED: CPT | Performed by: NURSE PRACTITIONER

## 2020-10-07 PROCEDURE — 85018 HEMOGLOBIN: CPT | Performed by: NURSE PRACTITIONER

## 2020-10-07 PROCEDURE — 99213 OFFICE O/P EST LOW 20 MIN: CPT | Performed by: NURSE PRACTITIONER

## 2020-10-07 PROCEDURE — 84466 ASSAY OF TRANSFERRIN: CPT | Performed by: NURSE PRACTITIONER

## 2020-10-07 RX ORDER — HYDROXYZINE PAMOATE 25 MG/1
25 CAPSULE ORAL 3 TIMES DAILY PRN
Qty: 30 CAPSULE | Refills: 0 | Status: SHIPPED | OUTPATIENT
Start: 2020-10-07 | End: 2021-06-10

## 2020-10-07 NOTE — PROGRESS NOTES
"Subjective   Osiel Camargo is a 25 y.o. female.     [facility] Same Day Appointment    PCP: CAMPBELL Byers    CC: \"anxiety, weak and clammy hands, legs\"    Has had panic attacks on/off since March. Was given Vistaril, but afraid to take.  Was seen at [facility], but only went one time and didn't return again.  Reports she goes to sleep at night, but only sleeps a few hours and then wakes up and can't turn mind off.      Recent labs--CMP, TSH, Vitamin D.  Vitamin D low and taking supplement weekly.  Concerned about anemia, reports iron deficiency during her pregnancies.  Did have  this year in January and lost a lot of blood, but reports no labs were done and she wasn't given any transfusions or iron supplements.     Anxiety  Presents for initial visit. Onset was 1 to 6 months ago. The problem has been waxing and waning. Symptoms include decreased concentration, dizziness, insomnia, malaise, nervous/anxious behavior and panic (reports occasional \"panic attacks\"). Patient reports no chest pain, compulsions, confusion, depressed mood, dry mouth, excessive worry, feeling of choking, hyperventilation, impotence, irritability, muscle tension, nausea, obsessions, palpitations, restlessness, shortness of breath or suicidal ideas. Symptoms occur most days. The severity of symptoms is causing significant distress. Exacerbated by: family stress; concerns over her health. The quality of sleep is non-restorative. Nighttime awakenings: early a.m. for rest of night (usually awake by 2-3 am and can't go back to sleep).     Her past medical history is significant for anemia and anxiety/panic attacks. There is no history of arrhythmia, asthma, bipolar disorder, CAD, CHF, chronic lung disease, depression, fibromyalgia, hyperthyroidism or suicide attempts. Treatments tried: given Hydroxyzine. Compliance with prior treatments has been variable. Prior compliance problems include difficulty with treatment plan. "   Fatigue  Chronicity: persistent. The current episode started more than 1 month ago. The problem occurs daily. The problem has been unchanged. Associated symptoms include fatigue, headaches (daily) and vertigo (off/on). Pertinent negatives include no abdominal pain, anorexia, arthralgias, change in bowel habit, chest pain, chills, congestion, coughing, diaphoresis, fever, joint swelling, myalgias, nausea, neck pain, numbness, rash, sore throat, swollen glands, urinary symptoms, visual change, vomiting or weakness. The symptoms are aggravated by stress. She has tried rest for the symptoms. The treatment provided no relief.        The following portions of the patient's history were reviewed and updated as appropriate: allergies, current medications, past medical history, past social history, past surgical history and problem list.    Review of Systems   Constitutional: Positive for fatigue. Negative for appetite change, chills, diaphoresis, fever and irritability.   HENT: Negative for congestion, sore throat and swollen glands.    Eyes: Negative.    Respiratory: Negative.  Negative for cough and shortness of breath.    Cardiovascular: Negative.  Negative for chest pain and palpitations.   Gastrointestinal: Negative for abdominal pain, anorexia, change in bowel habit, diarrhea, nausea and vomiting.   Genitourinary: Negative for difficulty urinating and impotence.   Musculoskeletal: Negative for arthralgias, joint swelling, myalgias and neck pain.   Skin: Negative for rash.   Neurological: Positive for dizziness, vertigo (off/on) and headache. Negative for weakness, numbness and confusion.   Psychiatric/Behavioral: Positive for decreased concentration, sleep disturbance and stress. Negative for self-injury, suicidal ideas and depressed mood. The patient is nervous/anxious and has insomnia.      /70 (BP Location: Right arm, Patient Position: Sitting, Cuff Size: Large Adult)   Pulse 62   Temp 97.3 °F (36.3 °C)  "(Temporal)   Resp 20   Ht 170.2 cm (67\")   Wt 127 kg (279 lb 6 oz)   LMP 09/07/2020   SpO2 99%   Breastfeeding No   BMI 43.76 kg/m²     Objective   Physical Exam  Vitals signs and nursing note reviewed.   Constitutional:       General: She is not in acute distress.     Appearance: Normal appearance. She is obese.   HENT:      Head: Normocephalic and atraumatic.   Eyes:      General:         Right eye: No discharge.         Left eye: No discharge.      Conjunctiva/sclera: Conjunctivae normal.   Neck:      Musculoskeletal: Normal range of motion and neck supple. No neck rigidity.   Cardiovascular:      Rate and Rhythm: Normal rate and regular rhythm.      Comments: No peripheral edema    Pulmonary:      Effort: Pulmonary effort is normal. No respiratory distress.      Breath sounds: Normal breath sounds. No wheezing, rhonchi or rales.   Lymphadenopathy:      Cervical: No cervical adenopathy.   Skin:     General: Skin is warm and dry.   Neurological:      General: No focal deficit present.      Mental Status: She is alert and oriented to person, place, and time.   Psychiatric:         Mood and Affect: Mood is anxious.         Speech: Speech normal.         Behavior: Behavior normal. Behavior is cooperative.         Thought Content: Thought content normal.         Cognition and Memory: Cognition normal.       Recent Results (from the past 24 hour(s))   POCT hemoglobin    Collection Time: 10/07/20  5:32 PM    Specimen: Blood   Result Value Ref Range    Hemoglobin 10.9 (A) 12.0 - 17.0 g/dL     Xr Sinuses 3+ View (in Office)    Result Date: 9/10/2020  1. Unremarkable exam of the paranasal sinuses. Electronically signed by:  Anh Parker MD  9/10/2020 12:08 PM CDT Workstation: [id number]    Xr Chest Pa & Lateral (in Office)    Result Date: 9/10/2020  1. No radiographic evidence of acute cardiopulmonary disease. Electronically signed by:  Anh Parker MD  9/10/2020 12:07 PM CDT Workstation: " [id number]        Assessment/Plan   Diagnoses and all orders for this visit:    Anxiety  -     hydrOXYzine pamoate (Vistaril) 25 MG capsule; Take 1 capsule by mouth 3 (Three) Times a Day As Needed for Anxiety.    Other fatigue  -     POCT hemoglobin  -     CBC No Differential  -     Iron Profile  -     Vitamin B12  -     Folate      Encouraged to follow back up with [facility] regarding Anxiety--she will schedule her own appt  Does not wish to start on daily medication at this time, agreeable to try the Vistaril PRN -- new Rx given  F/U 1 month with PCP for recheck anxiety    Hemoglobin slightly low--will get CBC, Iron profile, B12 --will call with results when available    Patient's Body mass index is 43.76 kg/m². BMI is above normal parameters. Recommendations include: referral to primary care.    See PCP or RTC if symptoms persist/worsen  See PCP for routine f/u visit and management of chronic medical conditions      This document has been electronically signed by CAMPBELL Hernadez on October 7, 2020 17:39 CDT,.

## 2020-10-07 NOTE — PATIENT INSTRUCTIONS

## 2020-10-08 LAB
DEPRECATED RDW RBC AUTO: 43.7 FL (ref 37–54)
ERYTHROCYTE [DISTWIDTH] IN BLOOD BY AUTOMATED COUNT: 17.5 % (ref 12.3–15.4)
FOLATE SERPL-MCNC: 8.78 NG/ML (ref 4.78–24.2)
HCT VFR BLD AUTO: 33.1 % (ref 34–46.6)
HGB BLD-MCNC: 10.3 G/DL (ref 12–15.9)
IRON 24H UR-MRATE: 46 MCG/DL (ref 37–145)
IRON SATN MFR SERPL: 9 % (ref 20–50)
MCH RBC QN AUTO: 22.2 PG (ref 26.6–33)
MCHC RBC AUTO-ENTMCNC: 31.1 G/DL (ref 31.5–35.7)
MCV RBC AUTO: 71.3 FL (ref 79–97)
PLATELET # BLD AUTO: 351 10*3/MM3 (ref 140–450)
PMV BLD AUTO: 12.1 FL (ref 6–12)
RBC # BLD AUTO: 4.64 10*6/MM3 (ref 3.77–5.28)
TIBC SERPL-MCNC: 519 MCG/DL (ref 298–536)
TRANSFERRIN SERPL-MCNC: 348 MG/DL (ref 200–360)
VIT B12 BLD-MCNC: 670 PG/ML (ref 211–946)
WBC # BLD AUTO: 5.6 10*3/MM3 (ref 3.4–10.8)

## 2020-10-12 DIAGNOSIS — D50.9 IRON DEFICIENCY ANEMIA, UNSPECIFIED IRON DEFICIENCY ANEMIA TYPE: Primary | ICD-10-CM

## 2020-10-12 RX ORDER — FERROUS SULFATE 324(65)MG
324 TABLET, DELAYED RELEASE (ENTERIC COATED) ORAL
Qty: 30 TABLET | Refills: 0 | Status: SHIPPED | OUTPATIENT
Start: 2020-10-12 | End: 2020-12-01 | Stop reason: SDUPTHER

## 2020-10-12 NOTE — PROGRESS NOTES
Pt notified of lab results, pt states she started taking an OTC iron supplement and said she would stop that one and start the rx you sent in, I let her know that vitamin c will help with absorption and that she has an appt with Emily at the end of the month to follow up and let her know about refills at the time of the visit.

## 2020-10-16 ENCOUNTER — OFFICE VISIT (OUTPATIENT)
Dept: FAMILY MEDICINE CLINIC | Facility: CLINIC | Age: 25
End: 2020-10-16

## 2020-10-16 VITALS
SYSTOLIC BLOOD PRESSURE: 108 MMHG | DIASTOLIC BLOOD PRESSURE: 57 MMHG | OXYGEN SATURATION: 97 % | BODY MASS INDEX: 44.89 KG/M2 | HEIGHT: 67 IN | HEART RATE: 71 BPM | RESPIRATION RATE: 20 BRPM | WEIGHT: 286 LBS | TEMPERATURE: 97.8 F

## 2020-10-16 DIAGNOSIS — R10.84 ABDOMINAL PAIN, GENERALIZED: Primary | ICD-10-CM

## 2020-10-16 DIAGNOSIS — K59.00 CONSTIPATION, UNSPECIFIED CONSTIPATION TYPE: ICD-10-CM

## 2020-10-16 DIAGNOSIS — R14.1 GAS PAIN: ICD-10-CM

## 2020-10-16 PROCEDURE — 99214 OFFICE O/P EST MOD 30 MIN: CPT | Performed by: NURSE PRACTITIONER

## 2020-10-16 RX ORDER — POLYETHYLENE GLYCOL 3350 17 G/17G
17 POWDER, FOR SOLUTION ORAL DAILY
Qty: 30 PACKET | Refills: 3 | Status: SHIPPED | OUTPATIENT
Start: 2020-10-16 | End: 2021-06-10

## 2020-10-16 RX ORDER — SIMETHICONE 80 MG
80 TABLET,CHEWABLE ORAL EVERY 6 HOURS PRN
Qty: 30 TABLET | Refills: 3 | Status: SHIPPED | OUTPATIENT
Start: 2020-10-16 | End: 2021-06-10

## 2020-10-19 NOTE — PATIENT INSTRUCTIONS
Calorie Counting for Weight Loss  Calories are units of energy. Your body needs a certain amount of calories from food to keep you going throughout the day. When you eat more calories than your body needs, your body stores the extra calories as fat. When you eat fewer calories than your body needs, your body burns fat to get the energy it needs.  Calorie counting means keeping track of how many calories you eat and drink each day. Calorie counting can be helpful if you need to lose weight. If you make sure to eat fewer calories than your body needs, you should lose weight. Ask your health care provider what a healthy weight is for you.  For calorie counting to work, you will need to eat the right number of calories in a day in order to lose a healthy amount of weight per week. A dietitian can help you determine how many calories you need in a day and will give you suggestions on how to reach your calorie goal.  · A healthy amount of weight to lose per week is usually 1-2 lb (0.5-0.9 kg). This usually means that your daily calorie intake should be reduced by 500-750 calories.  · Eating 1,200 - 1,500 calories per day can help most women lose weight.  · Eating 1,500 - 1,800 calories per day can help most men lose weight.  What is my plan?  My goal is to have __________ calories per day.  If I have this many calories per day, I should lose around __________ pounds per week.  What do I need to know about calorie counting?  In order to meet your daily calorie goal, you will need to:  · Find out how many calories are in each food you would like to eat. Try to do this before you eat.  · Decide how much of the food you plan to eat.  · Write down what you ate and how many calories it had. Doing this is called keeping a food log.  To successfully lose weight, it is important to balance calorie counting with a healthy lifestyle that includes regular activity. Aim for 150 minutes of moderate exercise (such as walking) or 75  minutes of vigorous exercise (such as running) each week.  Where do I find calorie information?    The number of calories in a food can be found on a Nutrition Facts label. If a food does not have a Nutrition Facts label, try to look up the calories online or ask your dietitian for help.  Remember that calories are listed per serving. If you choose to have more than one serving of a food, you will have to multiply the calories per serving by the amount of servings you plan to eat. For example, the label on a package of bread might say that a serving size is 1 slice and that there are 90 calories in a serving. If you eat 1 slice, you will have eaten 90 calories. If you eat 2 slices, you will have eaten 180 calories.  How do I keep a food log?  Immediately after each meal, record the following information in your food log:  · What you ate. Don't forget to include toppings, sauces, and other extras on the food.  · How much you ate. This can be measured in cups, ounces, or number of items.  · How many calories each food and drink had.  · The total number of calories in the meal.  Keep your food log near you, such as in a small notebook in your pocket, or use a mobile justine or website. Some programs will calculate calories for you and show you how many calories you have left for the day to meet your goal.  What are some calorie counting tips?    · Use your calories on foods and drinks that will fill you up and not leave you hungry:  ? Some examples of foods that fill you up are nuts and nut butters, vegetables, lean proteins, and high-fiber foods like whole grains. High-fiber foods are foods with more than 5 g fiber per serving.  ? Drinks such as sodas, specialty coffee drinks, alcohol, and juices have a lot of calories, yet do not fill you up.  · Eat nutritious foods and avoid empty calories. Empty calories are calories you get from foods or beverages that do not have many vitamins or protein, such as candy, sweets, and  "soda. It is better to have a nutritious high-calorie food (such as an avocado) than a food with few nutrients (such as a bag of chips).  · Know how many calories are in the foods you eat most often. This will help you calculate calorie counts faster.  · Pay attention to calories in drinks. Low-calorie drinks include water and unsweetened drinks.  · Pay attention to nutrition labels for \"low fat\" or \"fat free\" foods. These foods sometimes have the same amount of calories or more calories than the full fat versions. They also often have added sugar, starch, or salt, to make up for flavor that was removed with the fat.  · Find a way of tracking calories that works for you. Get creative. Try different apps or programs if writing down calories does not work for you.  What are some portion control tips?  · Know how many calories are in a serving. This will help you know how many servings of a certain food you can have.  · Use a measuring cup to measure serving sizes. You could also try weighing out portions on a kitchen scale. With time, you will be able to estimate serving sizes for some foods.  · Take some time to put servings of different foods on your favorite plates, bowls, and cups so you know what a serving looks like.  · Try not to eat straight from a bag or box. Doing this can lead to overeating. Put the amount you would like to eat in a cup or on a plate to make sure you are eating the right portion.  · Use smaller plates, glasses, and bowls to prevent overeating.  · Try not to multitask (for example, watch TV or use your computer) while eating. If it is time to eat, sit down at a table and enjoy your food. This will help you to know when you are full. It will also help you to be aware of what you are eating and how much you are eating.  What are tips for following this plan?  Reading food labels  · Check the calorie count compared to the serving size. The serving size may be smaller than what you are used to " "eating.  · Check the source of the calories. Make sure the food you are eating is high in vitamins and protein and low in saturated and trans fats.  Shopping  · Read nutrition labels while you shop. This will help you make healthy decisions before you decide to purchase your food.  · Make a grocery list and stick to it.  Cooking  · Try to cook your favorite foods in a healthier way. For example, try baking instead of frying.  · Use low-fat dairy products.  Meal planning  · Use more fruits and vegetables. Half of your plate should be fruits and vegetables.  · Include lean proteins like poultry and fish.  How do I count calories when eating out?  · Ask for smaller portion sizes.  · Consider sharing an entree and sides instead of getting your own entree.  · If you get your own entree, eat only half. Ask for a box at the beginning of your meal and put the rest of your entree in it so you are not tempted to eat it.  · If calories are listed on the menu, choose the lower calorie options.  · Choose dishes that include vegetables, fruits, whole grains, low-fat dairy products, and lean protein.  · Choose items that are boiled, broiled, grilled, or steamed. Stay away from items that are buttered, battered, fried, or served with cream sauce. Items labeled \"crispy\" are usually fried, unless stated otherwise.  · Choose water, low-fat milk, unsweetened iced tea, or other drinks without added sugar. If you want an alcoholic beverage, choose a lower calorie option such as a glass of wine or light beer.  · Ask for dressings, sauces, and syrups on the side. These are usually high in calories, so you should limit the amount you eat.  · If you want a salad, choose a garden salad and ask for grilled meats. Avoid extra toppings like mae, cheese, or fried items. Ask for the dressing on the side, or ask for olive oil and vinegar or lemon to use as dressing.  · Estimate how many servings of a food you are given. For example, a serving of " cooked rice is ½ cup or about the size of half a baseball. Knowing serving sizes will help you be aware of how much food you are eating at restaurants. The list below tells you how big or small some common portion sizes are based on everyday objects:  ? 1 oz--4 stacked dice.  ? 3 oz--1 deck of cards.  ? 1 tsp--1 die.  ? 1 Tbsp--½ a ping-pong ball.  ? 2 Tbsp--1 ping-pong ball.  ? ½ cup--½ baseball.  ? 1 cup--1 baseball.  Summary  · Calorie counting means keeping track of how many calories you eat and drink each day. If you eat fewer calories than your body needs, you should lose weight.  · A healthy amount of weight to lose per week is usually 1-2 lb (0.5-0.9 kg). This usually means reducing your daily calorie intake by 500-750 calories.  · The number of calories in a food can be found on a Nutrition Facts label. If a food does not have a Nutrition Facts label, try to look up the calories online or ask your dietitian for help.  · Use your calories on foods and drinks that will fill you up, and not on foods and drinks that will leave you hungry.  · Use smaller plates, glasses, and bowls to prevent overeating.  This information is not intended to replace advice given to you by your health care provider. Make sure you discuss any questions you have with your health care provider.  Document Released: 12/18/2006 Document Revised: 09/06/2019 Document Reviewed: 11/17/2017  [employer] Patient Education © 2020 [employer]      Exercising to Lose Weight  Exercise is structured, repetitive physical activity to improve fitness and health. Getting regular exercise is important for everyone. It is especially important if you are overweight. Being overweight increases your risk of heart disease, stroke, diabetes, high blood pressure, and several types of cancer. Reducing your calorie intake and exercising can help you lose weight.  Exercise is usually categorized as moderate or vigorous intensity. To lose weight, most people need  to do a certain amount of moderate-intensity or vigorous-intensity exercise each week.  Moderate-intensity exercise    Moderate-intensity exercise is any activity that gets you moving enough to burn at least three times more energy (calories) than if you were sitting.  Examples of moderate exercise include:  · Walking a mile in 15 minutes.  · Doing light yard work.  · Biking at an easy pace.  Most people should get at least 150 minutes (2 hours and 30 minutes) a week of moderate-intensity exercise to maintain their body weight.  Vigorous-intensity exercise  Vigorous-intensity exercise is any activity that gets you moving enough to burn at least six times more calories than if you were sitting. When you exercise at this intensity, you should be working hard enough that you are not able to carry on a conversation.  Examples of vigorous exercise include:  · Running.  · Playing a team sport, such as football, basketball, and soccer.  · Jumping rope.  Most people should get at least 75 minutes (1 hour and 15 minutes) a week of vigorous-intensity exercise to maintain their body weight.  How can exercise affect me?  When you exercise enough to burn more calories than you eat, you lose weight. Exercise also reduces body fat and builds muscle. The more muscle you have, the more calories you burn. Exercise also:  · Improves mood.  · Reduces stress and tension.  · Improves your overall fitness, flexibility, and endurance.  · Increases bone strength.  The amount of exercise you need to lose weight depends on:  · Your age.  · The type of exercise.  · Any health conditions you have.  · Your overall physical ability.  Talk to your health care provider about how much exercise you need and what types of activities are safe for you.  What actions can I take to lose weight?  Nutrition    · Make changes to your diet as told by your health care provider or diet and nutrition specialist (dietitian). This may include:  ? Eating fewer  calories.  ? Eating more protein.  ? Eating less unhealthy fats.  ? Eating a diet that includes fresh fruits and vegetables, whole grains, low-fat dairy products, and lean protein.  ? Avoiding foods with added fat, salt, and sugar.  · Drink plenty of water while you exercise to prevent dehydration or heat stroke.  Activity  · Choose an activity that you enjoy and set realistic goals. Your health care provider can help you make an exercise plan that works for you.  · Exercise at a moderate or vigorous intensity most days of the week.  ? The intensity of exercise may vary from person to person. You can tell how intense a workout is for you by paying attention to your breathing and heartbeat. Most people will notice their breathing and heartbeat get faster with more intense exercise.  · Do resistance training twice each week, such as:  ? Push-ups.  ? Sit-ups.  ? Lifting weights.  ? Using resistance bands.  · Getting short amounts of exercise can be just as helpful as long structured periods of exercise. If you have trouble finding time to exercise, try to include exercise in your daily routine.  ? Get up, stretch, and walk around every 30 minutes throughout the day.  ? Go for a walk during your lunch break.  ? Park your car farther away from your destination.  ? If you take public transportation, get off one stop early and walk the rest of the way.  ? Make phone calls while standing up and walking around.  ? Take the stairs instead of elevators or escalators.  · Wear comfortable clothes and shoes with good support.  · Do not exercise so much that you hurt yourself, feel dizzy, or get very short of breath.  Where to find more information  · [place] Department of Health and Human Services: www.hhs.gov  · Centers for Disease Control and Prevention (CDC): www.cdc.gov  Contact a health care provider:  · Before starting a new exercise program.  · If you have questions or concerns about your weight.  · If you have a medical  problem that keeps you from exercising.  Get help right away if you have any of the following while exercising:  · Injury.  · Dizziness.  · Difficulty breathing or shortness of breath that does not go away when you stop exercising.  · Chest pain.  · Rapid heartbeat.  Summary  · Being overweight increases your risk of heart disease, stroke, diabetes, high blood pressure, and several types of cancer.  · Losing weight happens when you burn more calories than you eat.  · Reducing the amount of calories you eat in addition to getting regular moderate or vigorous exercise each week helps you lose weight.  This information is not intended to replace advice given to you by your health care provider. Make sure you discuss any questions you have with your health care provider.  Document Released: 01/20/2012 Document Revised: 12/31/2018 Document Reviewed: 12/31/2018  [employer] Patient Education © 2020 [employer]

## 2020-10-19 NOTE — PROGRESS NOTES
Subjective   Nida Camargo is a 25 y.o. female.     nida Camargo age 25 comes in today with exacerbation of gas pains and abdominal cramping.  She reports her bowels not moving well.  This been going on for a week or so since she started taking the ferrous sulfate for anemia.    Abdominal Cramping  This is a new problem. The current episode started 1 to 4 weeks ago. The onset quality is sudden. The problem occurs intermittently. The problem has been waxing and waning. The pain is located in the generalized abdominal region. The pain is at a severity of 3/10. The pain is mild. The quality of the pain is cramping and colicky. The abdominal pain does not radiate. Associated symptoms include constipation and flatus. Pertinent negatives include no anorexia, arthralgias, belching, diarrhea, dysuria, fever, frequency, headaches, hematochezia, hematuria, melena, myalgias, nausea, vomiting or weight loss. Exacerbated by: Taking iron. The pain is relieved by nothing. She has tried nothing for the symptoms. The treatment provided no relief. There is no history of abdominal surgery, colon cancer, Crohn's disease, gallstones, GERD, irritable bowel syndrome, pancreatitis, PUD or ulcerative colitis.   Gas  This is a new problem. The current episode started 1 to 4 weeks ago. The problem occurs constantly. The problem has been waxing and waning. Associated symptoms include abdominal pain. Pertinent negatives include no anorexia, arthralgias, change in bowel habit, chest pain, chills, congestion, coughing, diaphoresis, fatigue, fever, headaches, joint swelling, myalgias, nausea, neck pain, numbness, rash, sore throat, swollen glands, urinary symptoms, vertigo, visual change, vomiting or weakness. Exacerbated by: iron. She has tried nothing for the symptoms. The treatment provided no relief.        The following portions of the patient's history were reviewed and updated as appropriate: allergies, current medications, past family  history, past medical history, past social history, past surgical history and problem list.    Review of Systems   Constitutional: Negative.  Negative for chills, diaphoresis, fatigue, fever and unexpected weight loss.   HENT: Negative.  Negative for congestion, sore throat and swollen glands.    Eyes: Negative.    Respiratory: Negative.  Negative for cough.    Cardiovascular: Negative.  Negative for chest pain.   Gastrointestinal: Positive for abdominal pain, constipation and flatus. Negative for anorexia, change in bowel habit, diarrhea, hematochezia, melena, nausea and vomiting.   Endocrine: Negative.    Genitourinary: Negative.  Negative for dysuria, frequency and hematuria.   Musculoskeletal: Negative.  Negative for arthralgias, joint swelling, myalgias and neck pain.   Skin: Negative.  Negative for rash.   Allergic/Immunologic: Negative.    Neurological: Negative for vertigo, weakness and numbness.   Hematological: Negative.        Objective   Physical Exam  Vitals signs and nursing note reviewed.   Constitutional:       General: She is not in acute distress.     Appearance: She is well-developed.   HENT:      Head: Normocephalic and atraumatic.      Right Ear: External ear normal.      Left Ear: External ear normal.      Nose: Nose normal.      Mouth/Throat:      Pharynx: No oropharyngeal exudate.   Eyes:      Pupils: Pupils are equal, round, and reactive to light.   Neck:      Musculoskeletal: Normal range of motion and neck supple.      Thyroid: No thyromegaly.   Cardiovascular:      Rate and Rhythm: Normal rate and regular rhythm.      Heart sounds: Normal heart sounds. No murmur. No friction rub.   Pulmonary:      Effort: Pulmonary effort is normal. No respiratory distress.      Breath sounds: Normal breath sounds. No wheezing or rales.   Abdominal:      General: Abdomen is flat. Bowel sounds are normal. There is no distension.      Palpations: Abdomen is soft.      Tenderness: There is abdominal  tenderness (  mild Generalized tenderness).      Comments: Flat and upright x-rays of the abdomen are normal   Musculoskeletal: Normal range of motion.   Skin:     General: Skin is warm and dry.   Neurological:      Mental Status: She is alert and oriented to person, place, and time.   Psychiatric:         Thought Content: Thought content normal.           Assessment/Plan   Diagnoses and all orders for this visit:    1. Abdominal pain, generalized (Primary)  -     XR Abdomen Flat & Upright (In Office)    2. Constipation, unspecified constipation type  -     polyethylene glycol (MIRALAX) 17 g packet; Take 17 g by mouth Daily.  Dispense: 30 packet; Refill: 3    3. Gas pain  -     simethicone (Gas-X) 80 MG chewable tablet; Chew 1 tablet Every 6 (Six) Hours As Needed for Flatulence.  Dispense: 30 tablet; Refill: 3      Is noted to be 44.8 which is considered obese.  Diet and exercise information will be provided this patient.

## 2020-11-13 ENCOUNTER — OFFICE VISIT (OUTPATIENT)
Dept: FAMILY MEDICINE CLINIC | Facility: CLINIC | Age: 25
End: 2020-11-13

## 2020-11-13 VITALS
BODY MASS INDEX: 43.95 KG/M2 | TEMPERATURE: 97.1 F | OXYGEN SATURATION: 100 % | HEIGHT: 67 IN | SYSTOLIC BLOOD PRESSURE: 106 MMHG | DIASTOLIC BLOOD PRESSURE: 80 MMHG | HEART RATE: 63 BPM | RESPIRATION RATE: 20 BRPM | WEIGHT: 280 LBS

## 2020-11-13 DIAGNOSIS — K08.89 PAIN, DENTAL: Primary | ICD-10-CM

## 2020-11-13 PROCEDURE — 99213 OFFICE O/P EST LOW 20 MIN: CPT | Performed by: NURSE PRACTITIONER

## 2020-11-13 RX ORDER — LIDOCAINE HYDROCHLORIDE 20 MG/ML
SOLUTION OROPHARYNGEAL
Qty: 100 ML | Refills: 0 | Status: SHIPPED | OUTPATIENT
Start: 2020-11-13 | End: 2021-01-04

## 2020-11-13 RX ORDER — AMOXICILLIN AND CLAVULANATE POTASSIUM 875; 125 MG/1; MG/1
1 TABLET, FILM COATED ORAL EVERY 12 HOURS SCHEDULED
Qty: 20 TABLET | Refills: 0 | Status: SHIPPED | OUTPATIENT
Start: 2020-11-13 | End: 2020-11-23

## 2020-11-13 RX ORDER — FLUCONAZOLE 150 MG/1
TABLET ORAL
Qty: 2 TABLET | Refills: 0 | Status: SHIPPED | OUTPATIENT
Start: 2020-11-13 | End: 2020-12-01 | Stop reason: SDUPTHER

## 2020-11-13 NOTE — PROGRESS NOTES
"Subjective   Osiel Camargo is a 25 y.o. female.     [facility]    PCP: CAMPBELL Byers    CC: \"pain in mouth\"    Has appt with dentist on 11-    Dental Pain   This is a recurrent problem. The current episode started more than 1 month ago (more constant in the last week). The problem occurs constantly. The problem has been gradually worsening. The pain is at a severity of 10/10. Associated symptoms include sinus pressure. Pertinent negatives include no difficulty swallowing, facial pain, fever, oral bleeding or thermal sensitivity. Associated symptoms comments: +headache  . She has tried acetaminophen and NSAIDs (peroxide) for the symptoms. The treatment provided mild relief.        The following portions of the patient's history were reviewed and updated as appropriate: allergies, current medications, past medical history, past social history, past surgical history and problem list.    Review of Systems   Constitutional: Negative.  Negative for fever.   HENT: Positive for dental problem and sinus pressure. Negative for ear pain, facial swelling, postnasal drip, rhinorrhea, sneezing, sore throat and swollen glands.    Eyes: Negative.    Respiratory: Negative.    Cardiovascular: Negative.    Genitourinary: Negative for difficulty urinating.   Musculoskeletal: Negative for myalgias.   Neurological: Positive for headache. Negative for dizziness.     /80 (BP Location: Left arm, Patient Position: Sitting, Cuff Size: Large Adult)   Pulse 63   Temp 97.1 °F (36.2 °C) (Temporal)   Resp 20   Ht 170.2 cm (67\")   Wt 127 kg (280 lb)   LMP 10/13/2020   SpO2 100%   Breastfeeding No   BMI 43.85 kg/m²     Objective   Physical Exam  Vitals signs and nursing note reviewed.   Constitutional:       General: She is not in acute distress.     Appearance: Normal appearance. She is obese. She is not ill-appearing.   HENT:      Head: Normocephalic and atraumatic.      Mouth/Throat:      Dentition: " Abnormal dentition. Dental tenderness, gingival swelling and dental caries present.     Neck:      Musculoskeletal: Neck supple.   Cardiovascular:      Rate and Rhythm: Normal rate and regular rhythm.   Pulmonary:      Effort: Pulmonary effort is normal. No respiratory distress.      Breath sounds: Normal breath sounds. No wheezing, rhonchi or rales.   Lymphadenopathy:      Cervical: Cervical adenopathy (shotty on right side, mildly tender) present.   Skin:     General: Skin is warm and dry.   Neurological:      General: No focal deficit present.      Mental Status: She is alert and oriented to person, place, and time.       No results found for this or any previous visit (from the past 24 hour(s)).  Xr Sinuses 3+ View (in Office)    Result Date: 9/10/2020  1. Unremarkable exam of the paranasal sinuses. Electronically signed by:  Anh Parker MD  9/10/2020 12:08 PM CDT Workstation: [id number]      Assessment/Plan   Diagnoses and all orders for this visit:    1. Pain, dental (Primary)  -     amoxicillin-clavulanate (Augmentin) 875-125 MG per tablet; Take 1 tablet by mouth Every 12 (Twelve) Hours for 10 days.  Dispense: 20 tablet; Refill: 0  -     fluconazole (Diflucan) 150 MG tablet; 1 tab po x 1 now, may repeat in 4 days prn yeast  Dispense: 2 tablet; Refill: 0  -     Lidocaine Viscous HCl (XYLOCAINE) 2 % solution; Saturate cotton ball and apply to affected tooth every 3 hours prn dental pain  Dispense: 100 mL; Refill: 0      Keep scheduled appointment with Dentist    Rx for Augmentin, Viscous lidocaine PRN    Continue with Tylenol, Motrin PRN    Continue with Peroxide rinses    Patient's Body mass index is 43.85 kg/m². BMI is above normal parameters. Recommendations include: referral to primary care.    See PCP for routine f/u visit and management of chronic medical conditions      This document has been electronically signed by CAMPBELL Hernadez on November 13, 2020 14:12 CST,.

## 2020-11-13 NOTE — PATIENT INSTRUCTIONS
Dental Pain  Dental pain may be caused by many things, including:  · Tooth decay (cavities or caries). Cavities expose the nerve of your tooth to air and to hot or cold temperatures. This can cause pain or discomfort.  · Abscess or infection. A dental abscess is a collection of pus from a bacterial infection in the inner part of the tooth (pulp). It usually occurs at the end of the root of a tooth.  · Injury.  · An unknown reason (idiopathic).  Your pain may be mild or severe. It may occur when you are:  · Chewing.  · Exposed to hot or cold temperatures.  · Eating or drinking sugary foods or beverages, such as soda or candy.  Your pain may be constant, or it may come and go without cause.  Follow these instructions at home:    Watch your dental pain for any changes. The following actions may help to lessen any discomfort that you are feeling:  Medicines  · Take over-the-counter and prescription medicines only as told by your health care provider.  · If you were prescribed an antibiotic medicine, take it as told by your health care provider. Do not stop taking the antibiotic even if you start to feel better.  Eating and drinking  · Avoid foods or drinks that cause you pain, such as:  ? Very hot or very cold foods or drinks.  ? Sweet or sugary foods or drinks.  Managing pain and swelling  · Apply ice to the painful area of your face:  ? Put ice in a plastic bag.  ? Place a towel between your skin and the bag.  ? Leave the ice on for 20 minutes, 2-3 times a day.  Brushing your teeth  · To keep your mouth and gums healthy, use fluoride toothpaste to brush your teeth twice a day. Floss once a day.  · Use a toothpaste made for sensitive teeth if directed by your health care provider.  · Brush your teeth with a soft-bristled toothbrush.  General instructions  · Do not apply heat to the outside of your face.  · Gargle with a salt-water mixture 3-4 times a day or as needed. To make a salt-water mixture, completely dissolve  ½-1 tsp of salt in 1 cup of warm water.  · Keep all follow-up visits as told by your health care provider. This is important.  · Apply ice to the outside of your jaw if there is swelling. Do not put ice directly on the skin.  Contact a health care provider if:  · Your pain is not controlled with medicines.  · Your symptoms get worse.  · You have new symptoms.  Get help right away if you:  · Are unable to open your mouth.  · Are having trouble breathing or swallowing.  · Have a fever.  · Notice that your face, neck, or jaw is swollen.  Summary  · Dental pain may be caused by many things, including tooth decay and infection.  · Your pain may be mild or severe.  · Take over-the-counter and prescription medicines only as told by your health care provider.  · Watch your dental pain for any changes. Let your health care provider know if symptoms get worse.  This information is not intended to replace advice given to you by your health care provider. Make sure you discuss any questions you have with your health care provider.  Document Released: 12/18/2006 Document Revised: 04/14/2020 Document Reviewed: 11/08/2018  Elsevier Patient Education © 2020 [employer]

## 2020-12-01 ENCOUNTER — LAB (OUTPATIENT)
Dept: LAB | Facility: HOSPITAL | Age: 25
End: 2020-12-01

## 2020-12-01 ENCOUNTER — OFFICE VISIT (OUTPATIENT)
Dept: FAMILY MEDICINE CLINIC | Facility: CLINIC | Age: 25
End: 2020-12-01

## 2020-12-01 VITALS
WEIGHT: 281 LBS | SYSTOLIC BLOOD PRESSURE: 117 MMHG | BODY MASS INDEX: 44.1 KG/M2 | HEART RATE: 76 BPM | DIASTOLIC BLOOD PRESSURE: 65 MMHG | OXYGEN SATURATION: 98 % | HEIGHT: 67 IN | TEMPERATURE: 98.1 F | RESPIRATION RATE: 20 BRPM

## 2020-12-01 DIAGNOSIS — K08.89 PAIN, DENTAL: Primary | ICD-10-CM

## 2020-12-01 DIAGNOSIS — J30.2 SEASONAL ALLERGIES: ICD-10-CM

## 2020-12-01 DIAGNOSIS — R10.84 GENERALIZED ABDOMINAL PAIN: ICD-10-CM

## 2020-12-01 DIAGNOSIS — D50.9 IRON DEFICIENCY ANEMIA, UNSPECIFIED IRON DEFICIENCY ANEMIA TYPE: ICD-10-CM

## 2020-12-01 PROCEDURE — 85007 BL SMEAR W/DIFF WBC COUNT: CPT | Performed by: NURSE PRACTITIONER

## 2020-12-01 PROCEDURE — 85025 COMPLETE CBC W/AUTO DIFF WBC: CPT | Performed by: NURSE PRACTITIONER

## 2020-12-01 PROCEDURE — 99214 OFFICE O/P EST MOD 30 MIN: CPT | Performed by: NURSE PRACTITIONER

## 2020-12-01 RX ORDER — FLUTICASONE PROPIONATE 50 MCG
2 SPRAY, SUSPENSION (ML) NASAL DAILY
Qty: 16 G | Refills: 2 | Status: SHIPPED | OUTPATIENT
Start: 2020-12-01 | End: 2021-06-10

## 2020-12-01 RX ORDER — CLINDAMYCIN HYDROCHLORIDE 300 MG/1
300 CAPSULE ORAL 4 TIMES DAILY
Qty: 40 CAPSULE | Refills: 0 | Status: SHIPPED | OUTPATIENT
Start: 2020-12-01 | End: 2020-12-23

## 2020-12-01 RX ORDER — FERROUS SULFATE 324(65)MG
324 TABLET, DELAYED RELEASE (ENTERIC COATED) ORAL
Qty: 30 TABLET | Refills: 0 | Status: SHIPPED | OUTPATIENT
Start: 2020-12-01 | End: 2021-02-08 | Stop reason: SDUPTHER

## 2020-12-01 RX ORDER — FLUCONAZOLE 150 MG/1
TABLET ORAL
Qty: 2 TABLET | Refills: 0 | Status: SHIPPED | OUTPATIENT
Start: 2020-12-01 | End: 2020-12-23

## 2020-12-01 RX ORDER — CETIRIZINE HYDROCHLORIDE 10 MG/1
10 TABLET ORAL DAILY
Qty: 30 TABLET | Refills: 2 | Status: SHIPPED | OUTPATIENT
Start: 2020-12-01 | End: 2021-06-10

## 2020-12-02 LAB
BURR CELLS BLD QL SMEAR: ABNORMAL
DEPRECATED RDW RBC AUTO: 40.9 FL (ref 37–54)
EOSINOPHIL # BLD MANUAL: 0.15 10*3/MM3 (ref 0–0.4)
EOSINOPHIL NFR BLD MANUAL: 3 % (ref 0.3–6.2)
ERYTHROCYTE [DISTWIDTH] IN BLOOD BY AUTOMATED COUNT: 16.1 % (ref 12.3–15.4)
HCT VFR BLD AUTO: 33.6 % (ref 34–46.6)
HELMET CELLS: ABNORMAL
HGB BLD-MCNC: 10.4 G/DL (ref 12–15.9)
LYMPHOCYTES # BLD MANUAL: 3.09 10*3/MM3 (ref 0.7–3.1)
LYMPHOCYTES NFR BLD MANUAL: 60 % (ref 19.6–45.3)
LYMPHOCYTES NFR BLD MANUAL: 9 % (ref 5–12)
MCH RBC QN AUTO: 22.5 PG (ref 26.6–33)
MCHC RBC AUTO-ENTMCNC: 31 G/DL (ref 31.5–35.7)
MCV RBC AUTO: 72.6 FL (ref 79–97)
MONOCYTES # BLD AUTO: 0.46 10*3/MM3 (ref 0.1–0.9)
NEUTROPHILS # BLD AUTO: 1.44 10*3/MM3 (ref 1.7–7)
NEUTROPHILS NFR BLD MANUAL: 28 % (ref 42.7–76)
PLAT MORPH BLD: NORMAL
PLATELET # BLD AUTO: 329 10*3/MM3 (ref 140–450)
PMV BLD AUTO: 11.9 FL (ref 6–12)
RBC # BLD AUTO: 4.63 10*6/MM3 (ref 3.77–5.28)
TARGETS BLD QL SMEAR: ABNORMAL
WBC # BLD AUTO: 5.15 10*3/MM3 (ref 3.4–10.8)
WBC MORPH BLD: NORMAL

## 2020-12-03 NOTE — PATIENT INSTRUCTIONS
Calorie Counting for Weight Loss  Calories are units of energy. Your body needs a certain amount of calories from food to keep you going throughout the day. When you eat more calories than your body needs, your body stores the extra calories as fat. When you eat fewer calories than your body needs, your body burns fat to get the energy it needs.  Calorie counting means keeping track of how many calories you eat and drink each day. Calorie counting can be helpful if you need to lose weight. If you make sure to eat fewer calories than your body needs, you should lose weight. Ask your health care provider what a healthy weight is for you.  For calorie counting to work, you will need to eat the right number of calories in a day in order to lose a healthy amount of weight per week. A dietitian can help you determine how many calories you need in a day and will give you suggestions on how to reach your calorie goal.  · A healthy amount of weight to lose per week is usually 1-2 lb (0.5-0.9 kg). This usually means that your daily calorie intake should be reduced by 500-750 calories.  · Eating 1,200 - 1,500 calories per day can help most women lose weight.  · Eating 1,500 - 1,800 calories per day can help most men lose weight.  What is my plan?  My goal is to have __________ calories per day.  If I have this many calories per day, I should lose around __________ pounds per week.  What do I need to know about calorie counting?  In order to meet your daily calorie goal, you will need to:  · Find out how many calories are in each food you would like to eat. Try to do this before you eat.  · Decide how much of the food you plan to eat.  · Write down what you ate and how many calories it had. Doing this is called keeping a food log.  To successfully lose weight, it is important to balance calorie counting with a healthy lifestyle that includes regular activity. Aim for 150 minutes of moderate exercise (such as walking) or 75  minutes of vigorous exercise (such as running) each week.  Where do I find calorie information?    The number of calories in a food can be found on a Nutrition Facts label. If a food does not have a Nutrition Facts label, try to look up the calories online or ask your dietitian for help.  Remember that calories are listed per serving. If you choose to have more than one serving of a food, you will have to multiply the calories per serving by the amount of servings you plan to eat. For example, the label on a package of bread might say that a serving size is 1 slice and that there are 90 calories in a serving. If you eat 1 slice, you will have eaten 90 calories. If you eat 2 slices, you will have eaten 180 calories.  How do I keep a food log?  Immediately after each meal, record the following information in your food log:  · What you ate. Don't forget to include toppings, sauces, and other extras on the food.  · How much you ate. This can be measured in cups, ounces, or number of items.  · How many calories each food and drink had.  · The total number of calories in the meal.  Keep your food log near you, such as in a small notebook in your pocket, or use a mobile justine or website. Some programs will calculate calories for you and show you how many calories you have left for the day to meet your goal.  What are some calorie counting tips?    · Use your calories on foods and drinks that will fill you up and not leave you hungry:  ? Some examples of foods that fill you up are nuts and nut butters, vegetables, lean proteins, and high-fiber foods like whole grains. High-fiber foods are foods with more than 5 g fiber per serving.  ? Drinks such as sodas, specialty coffee drinks, alcohol, and juices have a lot of calories, yet do not fill you up.  · Eat nutritious foods and avoid empty calories. Empty calories are calories you get from foods or beverages that do not have many vitamins or protein, such as candy, sweets, and  "soda. It is better to have a nutritious high-calorie food (such as an avocado) than a food with few nutrients (such as a bag of chips).  · Know how many calories are in the foods you eat most often. This will help you calculate calorie counts faster.  · Pay attention to calories in drinks. Low-calorie drinks include water and unsweetened drinks.  · Pay attention to nutrition labels for \"low fat\" or \"fat free\" foods. These foods sometimes have the same amount of calories or more calories than the full fat versions. They also often have added sugar, starch, or salt, to make up for flavor that was removed with the fat.  · Find a way of tracking calories that works for you. Get creative. Try different apps or programs if writing down calories does not work for you.  What are some portion control tips?  · Know how many calories are in a serving. This will help you know how many servings of a certain food you can have.  · Use a measuring cup to measure serving sizes. You could also try weighing out portions on a kitchen scale. With time, you will be able to estimate serving sizes for some foods.  · Take some time to put servings of different foods on your favorite plates, bowls, and cups so you know what a serving looks like.  · Try not to eat straight from a bag or box. Doing this can lead to overeating. Put the amount you would like to eat in a cup or on a plate to make sure you are eating the right portion.  · Use smaller plates, glasses, and bowls to prevent overeating.  · Try not to multitask (for example, watch TV or use your computer) while eating. If it is time to eat, sit down at a table and enjoy your food. This will help you to know when you are full. It will also help you to be aware of what you are eating and how much you are eating.  What are tips for following this plan?  Reading food labels  · Check the calorie count compared to the serving size. The serving size may be smaller than what you are used to " "eating.  · Check the source of the calories. Make sure the food you are eating is high in vitamins and protein and low in saturated and trans fats.  Shopping  · Read nutrition labels while you shop. This will help you make healthy decisions before you decide to purchase your food.  · Make a grocery list and stick to it.  Cooking  · Try to cook your favorite foods in a healthier way. For example, try baking instead of frying.  · Use low-fat dairy products.  Meal planning  · Use more fruits and vegetables. Half of your plate should be fruits and vegetables.  · Include lean proteins like poultry and fish.  How do I count calories when eating out?  · Ask for smaller portion sizes.  · Consider sharing an entree and sides instead of getting your own entree.  · If you get your own entree, eat only half. Ask for a box at the beginning of your meal and put the rest of your entree in it so you are not tempted to eat it.  · If calories are listed on the menu, choose the lower calorie options.  · Choose dishes that include vegetables, fruits, whole grains, low-fat dairy products, and lean protein.  · Choose items that are boiled, broiled, grilled, or steamed. Stay away from items that are buttered, battered, fried, or served with cream sauce. Items labeled \"crispy\" are usually fried, unless stated otherwise.  · Choose water, low-fat milk, unsweetened iced tea, or other drinks without added sugar. If you want an alcoholic beverage, choose a lower calorie option such as a glass of wine or light beer.  · Ask for dressings, sauces, and syrups on the side. These are usually high in calories, so you should limit the amount you eat.  · If you want a salad, choose a garden salad and ask for grilled meats. Avoid extra toppings like mae, cheese, or fried items. Ask for the dressing on the side, or ask for olive oil and vinegar or lemon to use as dressing.  · Estimate how many servings of a food you are given. For example, a serving of " cooked rice is ½ cup or about the size of half a baseball. Knowing serving sizes will help you be aware of how much food you are eating at restaurants. The list below tells you how big or small some common portion sizes are based on everyday objects:  ? 1 oz--4 stacked dice.  ? 3 oz--1 deck of cards.  ? 1 tsp--1 die.  ? 1 Tbsp--½ a ping-pong ball.  ? 2 Tbsp--1 ping-pong ball.  ? ½ cup--½ baseball.  ? 1 cup--1 baseball.  Summary  · Calorie counting means keeping track of how many calories you eat and drink each day. If you eat fewer calories than your body needs, you should lose weight.  · A healthy amount of weight to lose per week is usually 1-2 lb (0.5-0.9 kg). This usually means reducing your daily calorie intake by 500-750 calories.  · The number of calories in a food can be found on a Nutrition Facts label. If a food does not have a Nutrition Facts label, try to look up the calories online or ask your dietitian for help.  · Use your calories on foods and drinks that will fill you up, and not on foods and drinks that will leave you hungry.  · Use smaller plates, glasses, and bowls to prevent overeating.  This information is not intended to replace advice given to you by your health care provider. Make sure you discuss any questions you have with your health care provider.  Document Revised: 09/06/2019 Document Reviewed: 11/17/2017  [employer] Patient Education © 2020 [employer]      Exercising to Lose Weight  Exercise is structured, repetitive physical activity to improve fitness and health. Getting regular exercise is important for everyone. It is especially important if you are overweight. Being overweight increases your risk of heart disease, stroke, diabetes, high blood pressure, and several types of cancer. Reducing your calorie intake and exercising can help you lose weight.  Exercise is usually categorized as moderate or vigorous intensity. To lose weight, most people need to do a certain amount of  moderate-intensity or vigorous-intensity exercise each week.  Moderate-intensity exercise    Moderate-intensity exercise is any activity that gets you moving enough to burn at least three times more energy (calories) than if you were sitting.  Examples of moderate exercise include:  · Walking a mile in 15 minutes.  · Doing light yard work.  · Biking at an easy pace.  Most people should get at least 150 minutes (2 hours and 30 minutes) a week of moderate-intensity exercise to maintain their body weight.  Vigorous-intensity exercise  Vigorous-intensity exercise is any activity that gets you moving enough to burn at least six times more calories than if you were sitting. When you exercise at this intensity, you should be working hard enough that you are not able to carry on a conversation.  Examples of vigorous exercise include:  · Running.  · Playing a team sport, such as football, basketball, and soccer.  · Jumping rope.  Most people should get at least 75 minutes (1 hour and 15 minutes) a week of vigorous-intensity exercise to maintain their body weight.  How can exercise affect me?  When you exercise enough to burn more calories than you eat, you lose weight. Exercise also reduces body fat and builds muscle. The more muscle you have, the more calories you burn. Exercise also:  · Improves mood.  · Reduces stress and tension.  · Improves your overall fitness, flexibility, and endurance.  · Increases bone strength.  The amount of exercise you need to lose weight depends on:  · Your age.  · The type of exercise.  · Any health conditions you have.  · Your overall physical ability.  Talk to your health care provider about how much exercise you need and what types of activities are safe for you.  What actions can I take to lose weight?  Nutrition    · Make changes to your diet as told by your health care provider or diet and nutrition specialist (dietitian). This may include:  ? Eating fewer calories.  ? Eating more  protein.  ? Eating less unhealthy fats.  ? Eating a diet that includes fresh fruits and vegetables, whole grains, low-fat dairy products, and lean protein.  ? Avoiding foods with added fat, salt, and sugar.  · Drink plenty of water while you exercise to prevent dehydration or heat stroke.  Activity  · Choose an activity that you enjoy and set realistic goals. Your health care provider can help you make an exercise plan that works for you.  · Exercise at a moderate or vigorous intensity most days of the week.  ? The intensity of exercise may vary from person to person. You can tell how intense a workout is for you by paying attention to your breathing and heartbeat. Most people will notice their breathing and heartbeat get faster with more intense exercise.  · Do resistance training twice each week, such as:  ? Push-ups.  ? Sit-ups.  ? Lifting weights.  ? Using resistance bands.  · Getting short amounts of exercise can be just as helpful as long structured periods of exercise. If you have trouble finding time to exercise, try to include exercise in your daily routine.  ? Get up, stretch, and walk around every 30 minutes throughout the day.  ? Go for a walk during your lunch break.  ? Park your car farther away from your destination.  ? If you take public transportation, get off one stop early and walk the rest of the way.  ? Make phone calls while standing up and walking around.  ? Take the stairs instead of elevators or escalators.  · Wear comfortable clothes and shoes with good support.  · Do not exercise so much that you hurt yourself, feel dizzy, or get very short of breath.  Where to find more information  · [place] Department of Health and Human Services: www.hhs.gov  · Centers for Disease Control and Prevention (CDC): www.cdc.gov  Contact a health care provider:  · Before starting a new exercise program.  · If you have questions or concerns about your weight.  · If you have a medical problem that keeps you from  exercising.  Get help right away if you have any of the following while exercising:  · Injury.  · Dizziness.  · Difficulty breathing or shortness of breath that does not go away when you stop exercising.  · Chest pain.  · Rapid heartbeat.  Summary  · Being overweight increases your risk of heart disease, stroke, diabetes, high blood pressure, and several types of cancer.  · Losing weight happens when you burn more calories than you eat.  · Reducing the amount of calories you eat in addition to getting regular moderate or vigorous exercise each week helps you lose weight.  This information is not intended to replace advice given to you by your health care provider. Make sure you discuss any questions you have with your health care provider.  Document Revised: 12/31/2018 Document Reviewed: 12/31/2018  [employer] Patient Education © 2020 [employer]

## 2020-12-03 NOTE — PROGRESS NOTES
Subjective   Osiel Camargo is a 25 y.o. female.     Osiel Camargo 25 comes today with chief complaint of abdominal pain due to constipation.  At this time she not taking the MiraLAX and does have some generalized abdominal pain that does resolve she has a bowel movement.  Also has history of anemia needs to have her CBC rechecked.  She is complaining of dental pain with a toothache right lower tooth.  Back in the molar area.  Has been taking an antibiotic given to her by her dentist.  However she states is not helped.    Abdominal Pain  This is a recurrent problem. The current episode started more than 1 month ago. The problem occurs intermittently. The problem has been waxing and waning. The pain is located in the generalized abdominal region. The pain is at a severity of 2/10. The pain is mild. The quality of the pain is colicky and cramping. The abdominal pain does not radiate. Associated symptoms include constipation. Pertinent negatives include no anorexia, arthralgias, belching, diarrhea, dysuria, fever, flatus, frequency, headaches, hematochezia, hematuria, melena, myalgias, nausea, vomiting or weight loss. Nothing aggravates the pain. The pain is relieved by bowel movements. The treatment provided significant relief. Her past medical history is significant for irritable bowel syndrome. There is no history of abdominal surgery, colon cancer, Crohn's disease, gallstones, GERD, pancreatitis, PUD or ulcerative colitis.   Anemia  Presents for follow-up visit. Symptoms include abdominal pain. There has been no anorexia, bruising/bleeding easily, confusion, fever, leg swelling, light-headedness, malaise/fatigue, pallor, palpitations, paresthesias, pica or weight loss. Signs of blood loss that are not present include hematochezia and melena. Compliance with medications is 51-75%.   Dental Pain   This is a recurrent problem. The current episode started in the past 7 days. The problem occurs constantly. The pain is at a  severity of 5/10. The pain is moderate. Pertinent negatives include no difficulty swallowing, facial pain, fever, oral bleeding, sinus pressure or thermal sensitivity. She has tried NSAIDs for the symptoms. The treatment provided no relief.        The following portions of the patient's history were reviewed and updated as appropriate: allergies, current medications, past family history, past medical history, past social history, past surgical history and problem list.    Review of Systems   Constitutional: Negative.  Negative for fever, malaise/fatigue and unexpected weight loss.   HENT: Positive for dental problem. Negative for sinus pressure.    Eyes: Negative.    Respiratory: Negative.    Cardiovascular: Negative.  Negative for palpitations.   Gastrointestinal: Positive for abdominal pain and constipation. Negative for anorexia, diarrhea, flatus, hematochezia, melena, nausea and vomiting.   Endocrine: Negative.    Genitourinary: Negative.  Negative for dysuria, frequency and hematuria.   Musculoskeletal: Negative.  Negative for arthralgias and myalgias.   Skin: Negative.  Negative for pallor.   Allergic/Immunologic: Negative.    Neurological: Negative for light-headedness, paresthesias and confusion.   Hematological: Negative.  Does not bruise/bleed easily.       Objective   Physical Exam  Vitals signs and nursing note reviewed.   Constitutional:       General: She is not in acute distress.     Appearance: She is well-developed.   HENT:      Head: Normocephalic and atraumatic.      Comments: Dental Caries noted in the right lower molar area     Right Ear: External ear normal.      Left Ear: External ear normal.      Nose: Nose normal.      Mouth/Throat:      Pharynx: No oropharyngeal exudate.   Eyes:      Pupils: Pupils are equal, round, and reactive to light.   Neck:      Musculoskeletal: Normal range of motion and neck supple.      Thyroid: No thyromegaly.   Cardiovascular:      Rate and Rhythm: Normal rate  and regular rhythm.      Heart sounds: Normal heart sounds. No murmur. No friction rub.   Pulmonary:      Effort: Pulmonary effort is normal. No respiratory distress.      Breath sounds: Normal breath sounds. No wheezing or rales.   Abdominal:      Palpations: Abdomen is soft.   Musculoskeletal: Normal range of motion.   Skin:     General: Skin is warm and dry.   Neurological:      Mental Status: She is alert and oriented to person, place, and time.   Psychiatric:         Thought Content: Thought content normal.           Assessment/Plan   Diagnoses and all orders for this visit:    1. Pain, dental (Primary)  -     fluconazole (Diflucan) 150 MG tablet; 1 tab po x 1 now, may repeat in 4 days prn yeast  Dispense: 2 tablet; Refill: 0  -     clindamycin (CLEOCIN) 300 MG capsule; Take 1 capsule by mouth 4 (Four) Times a Day.  Dispense: 40 capsule; Refill: 0    2. Seasonal allergies  -     cetirizine (zyrTEC) 10 MG tablet; Take 1 tablet by mouth Daily.  Dispense: 30 tablet; Refill: 2  -     fluticasone (Flonase) 50 MCG/ACT nasal spray; 2 sprays into the nostril(s) as directed by provider Daily.  Dispense: 16 g; Refill: 2    3. Iron deficiency anemia, unspecified iron deficiency anemia type  -     ferrous sulfate 324 MG tablet delayed-release; Take 1 tablet by mouth Daily With Breakfast.  Dispense: 30 tablet; Refill: 0  -     CBC & Differential  -     CBC Auto Differential  -     Cancel: Manual Differential  -     Manual Differential    4. Generalized abdominal pain  Comments:  Should resume taking the MiraLAX on a daily basis.      Is noted to be 45 which is considered obese.  Diet and exercise information will be provided the patient.

## 2020-12-04 ENCOUNTER — TELEPHONE (OUTPATIENT)
Dept: FAMILY MEDICINE CLINIC | Facility: CLINIC | Age: 25
End: 2020-12-04

## 2020-12-04 NOTE — TELEPHONE ENCOUNTER
----- Message from CAMPBELL Key sent at 12/4/2020  8:35 AM CST -----  Tell her to start taking her iron twice a dayl

## 2020-12-23 ENCOUNTER — TELEPHONE (OUTPATIENT)
Dept: FAMILY MEDICINE CLINIC | Facility: CLINIC | Age: 25
End: 2020-12-23

## 2020-12-23 ENCOUNTER — OFFICE VISIT (OUTPATIENT)
Dept: FAMILY MEDICINE CLINIC | Facility: CLINIC | Age: 25
End: 2020-12-23

## 2020-12-23 VITALS
BODY MASS INDEX: 43.85 KG/M2 | TEMPERATURE: 97.8 F | DIASTOLIC BLOOD PRESSURE: 80 MMHG | HEART RATE: 63 BPM | HEIGHT: 67 IN | WEIGHT: 279.4 LBS | SYSTOLIC BLOOD PRESSURE: 103 MMHG | OXYGEN SATURATION: 100 %

## 2020-12-23 DIAGNOSIS — J01.00 ACUTE MAXILLARY SINUSITIS, RECURRENCE NOT SPECIFIED: Primary | ICD-10-CM

## 2020-12-23 DIAGNOSIS — R42 DIZZINESS: ICD-10-CM

## 2020-12-23 DIAGNOSIS — D50.9 IRON DEFICIENCY ANEMIA, UNSPECIFIED IRON DEFICIENCY ANEMIA TYPE: ICD-10-CM

## 2020-12-23 DIAGNOSIS — H66.92 LEFT OTITIS MEDIA, UNSPECIFIED OTITIS MEDIA TYPE: ICD-10-CM

## 2020-12-23 LAB — HGB BLDA-MCNC: 10.9 G/DL (ref 12–17)

## 2020-12-23 PROCEDURE — 99214 OFFICE O/P EST MOD 30 MIN: CPT | Performed by: NURSE PRACTITIONER

## 2020-12-23 PROCEDURE — 85018 HEMOGLOBIN: CPT | Performed by: NURSE PRACTITIONER

## 2020-12-23 RX ORDER — FLUCONAZOLE 150 MG/1
TABLET ORAL
Qty: 2 TABLET | Refills: 0 | Status: SHIPPED | OUTPATIENT
Start: 2020-12-23 | End: 2021-01-04

## 2020-12-23 RX ORDER — PSEUDOEPHEDRINE HYDROCHLORIDE 60 MG/1
60 TABLET, FILM COATED ORAL EVERY 6 HOURS PRN
Qty: 30 TABLET | Refills: 0 | Status: SHIPPED | OUTPATIENT
Start: 2020-12-23 | End: 2021-01-04

## 2020-12-23 RX ORDER — CEFDINIR 300 MG/1
300 CAPSULE ORAL 2 TIMES DAILY
Qty: 20 CAPSULE | Refills: 0 | Status: SHIPPED | OUTPATIENT
Start: 2020-12-23 | End: 2021-06-10

## 2020-12-23 NOTE — PROGRESS NOTES
"Subjective   Osiel Camargo is a 25 y.o. female.     [facility]    PCP: CAMPBELL Byers    CC: \"light headed, dizzy, fatigue\"    Concerned about her iron levels.  Seen by PCP on 12-1 and Hgb at that time was 10.4.  Taking ferrous sulfate 325 mg once daily, but was instructed to start taking twice daily.  Reports she did not receive the message about increasing to BID.      C/O sinus pressure/congestion, pain in cheeks.  Having a lot of congestion at night.  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      Hx of Vitamin D deficiency as well, last check in August and was 14.  Taking weekly Vit D supplement.     Fatigue  Associated symptoms include congestion, fatigue and headaches. Pertinent negatives include no chills, coughing, diaphoresis, neck pain, sore throat or swollen glands.   Sinus Problem  Chronicity: persistent. The current episode started 1 to 4 weeks ago. The problem has been gradually worsening since onset. There has been no fever. She is experiencing no pain (pressure). Associated symptoms include congestion, headaches and sinus pressure ( maxillary). Pertinent negatives include no chills, coughing, diaphoresis, ear pain, hoarse voice, neck pain, shortness of breath, sneezing, sore throat or swollen glands. Treatments tried: reports she was recently treated for dental infection with Clindamycin, but no improvement in sinuses noted; has zyrtec and flonase. The treatment provided no relief.        The following portions of the patient's history were reviewed and updated as appropriate: allergies, current medications, past medical history, past social history, past surgical history and problem list.    Review of Systems   Constitutional: Positive for fatigue. Negative for chills, diaphoresis, unexpected weight gain and unexpected weight loss.   HENT: Positive for congestion, postnasal drip, rhinorrhea and sinus pressure ( maxillary). Negative for ear " "discharge, ear pain, hoarse voice, sneezing, sore throat and swollen glands.    Eyes: Negative.    Respiratory: Negative.  Negative for cough and shortness of breath.    Cardiovascular: Negative.    Gastrointestinal: Negative.    Genitourinary: Negative for difficulty urinating.   Musculoskeletal: Negative for neck pain.   Skin: Negative.    Neurological: Positive for dizziness, light-headedness and headache.     /80 (BP Location: Right arm, Patient Position: Sitting, Cuff Size: Adult)   Pulse 63   Temp 97.8 °F (36.6 °C) (Temporal)   Ht 170.2 cm (67\")   Wt 127 kg (279 lb 6.4 oz)   LMP 12/18/2020   SpO2 100%   BMI 43.76 kg/m²     Objective   Physical Exam  Vitals signs and nursing note reviewed.   Constitutional:       General: She is not in acute distress.     Appearance: Normal appearance. She is obese. She is not ill-appearing.   HENT:      Head: Normocephalic and atraumatic.      Right Ear: Tympanic membrane and ear canal normal.      Left Ear: Ear canal normal. Tympanic membrane is erythematous and bulging.      Nose: Congestion present.      Right Sinus: Maxillary sinus tenderness present. No frontal sinus tenderness.      Left Sinus: Maxillary sinus tenderness present. No frontal sinus tenderness.      Mouth/Throat:      Lips: Pink.      Pharynx: Oropharynx is clear. No pharyngeal swelling, oropharyngeal exudate or posterior oropharyngeal erythema.      Tonsils: No tonsillar exudate or tonsillar abscesses.   Eyes:      General:         Right eye: No discharge.         Left eye: No discharge.      Conjunctiva/sclera: Conjunctivae normal.   Neck:      Musculoskeletal: Neck supple.   Cardiovascular:      Rate and Rhythm: Normal rate and regular rhythm.   Pulmonary:      Effort: Pulmonary effort is normal. No respiratory distress.      Breath sounds: Normal breath sounds. No wheezing, rhonchi or rales.   Lymphadenopathy:      Cervical: No cervical adenopathy.   Skin:     General: Skin is warm and dry. "   Neurological:      General: No focal deficit present.      Mental Status: She is alert and oriented to person, place, and time.       Recent Results (from the past 24 hour(s))   POCT hemoglobin    Collection Time: 12/23/20  3:23 PM    Specimen: Blood   Result Value Ref Range    Hemoglobin 10.9 (A) 12.0 - 17.0 g/dL     Xr Sinuses 3+ View (in Office)    Result Date: 9/10/2020  1. Unremarkable exam of the paranasal sinuses. Electronically signed by:  Anh Parker MD  9/10/2020 12:08 PM CDT Workstation: [id number]    Xr Abdomen Flat & Upright (in Office)    Result Date: 10/16/2020  Unremarkable abdomen.   Electronically signed by:  Jaycob Price MD  10/16/2020 6:21 PM CDT Workstation: [id number]    Xr Chest Pa & Lateral (in Office)    Result Date: 9/10/2020  1. No radiographic evidence of acute cardiopulmonary disease. Electronically signed by:  Anh Parker MD  9/10/2020 12:07 PM CDT Workstation: [id number]        Assessment/Plan   Diagnoses and all orders for this visit:    1. Acute maxillary sinusitis, recurrence not specified (Primary)  -     cefdinir (OMNICEF) 300 MG capsule; Take 1 capsule by mouth 2 (Two) Times a Day.  Dispense: 20 capsule; Refill: 0  -     pseudoephedrine (SUDAFED) 60 MG tablet; Take 1 tablet by mouth Every 6 (Six) Hours As Needed for Congestion.  Dispense: 30 tablet; Refill: 0    2. Dizziness    3. Iron deficiency anemia, unspecified iron deficiency anemia type    4. Left otitis media, unspecified otitis media type  -     POCT hemoglobin  -     cefdinir (OMNICEF) 300 MG capsule; Take 1 capsule by mouth 2 (Two) Times a Day.  Dispense: 20 capsule; Refill: 0    Other orders  -     fluconazole (Diflucan) 150 MG tablet; 1 tab po x 1 now, may repeat in 4 days prn yeast  Dispense: 2 tablet; Refill: 0      Push fluids  Rest  Tylenol or Motrin as needed  Rx for Cefdinir, Sudafed for sinusitis/OM provided    Hemoglobin has increased slightly from recent check from 10.4 to 10.9.   Begin ferrous sulfate BID    Change positions slowly  Avoid ladders/stairs when dizzy    Patient's Body mass index is 43.76 kg/m². BMI is above normal parameters. Recommendations include: referral to primary care.    See PCP or RTC if symptoms persist/worsen  See PCP for routine f/u visit and management of chronic medical conditions      This document has been electronically signed by CAMPBELL Hernadez on December 23, 2020 15:51 CST,.

## 2020-12-23 NOTE — TELEPHONE ENCOUNTER
PATIENT CALLING IN REQUESTING TO SPEAK WITH A NURSE ABOUT HER IRON LEVELS AND POSSIBLY COMING IN TO GET A B12 SHOT.    PATIENT REQUESTING CALLBACK # [phone]

## 2020-12-23 NOTE — PATIENT INSTRUCTIONS
Sinusitis, Adult  Sinusitis is inflammation of your sinuses. Sinuses are hollow spaces in the bones around your face. Your sinuses are located:  · Around your eyes.  · In the middle of your forehead.  · Behind your nose.  · In your cheekbones.  Mucus normally drains out of your sinuses. When your nasal tissues become inflamed or swollen, mucus can become trapped or blocked. This allows bacteria, viruses, and fungi to grow, which leads to infection. Most infections of the sinuses are caused by a virus.  Sinusitis can develop quickly. It can last for up to 4 weeks (acute) or for more than 12 weeks (chronic). Sinusitis often develops after a cold.  What are the causes?  This condition is caused by anything that creates swelling in the sinuses or stops mucus from draining. This includes:  · Allergies.  · Asthma.  · Infection from bacteria or viruses.  · Deformities or blockages in your nose or sinuses.  · Abnormal growths in the nose (nasal polyps).  · Pollutants, such as chemicals or irritants in the air.  · Infection from fungi (rare).  What increases the risk?  You are more likely to develop this condition if you:  · Have a weak body defense system (immune system).  · Do a lot of swimming or diving.  · Overuse nasal sprays.  · Smoke.  What are the signs or symptoms?  The main symptoms of this condition are pain and a feeling of pressure around the affected sinuses. Other symptoms include:  · Stuffy nose or congestion.  · Thick drainage from your nose.  · Swelling and warmth over the affected sinuses.  · Headache.  · Upper toothache.  · A cough that may get worse at night.  · Extra mucus that collects in the throat or the back of the nose (postnasal drip).  · Decreased sense of smell and taste.  · Fatigue.  · A fever.  · Sore throat.  · Bad breath.  How is this diagnosed?  This condition is diagnosed based on:  · Your symptoms.  · Your medical history.  · A physical exam.  · Tests to find out if your condition is  acute or chronic. This may include:  ? Checking your nose for nasal polyps.  ? Viewing your sinuses using a device that has a light (endoscope).  ? Testing for allergies or bacteria.  ? Imaging tests, such as an MRI or CT scan.  In rare cases, a bone biopsy may be done to rule out more serious types of fungal sinus disease.  How is this treated?  Treatment for sinusitis depends on the cause and whether your condition is chronic or acute.  · If caused by a virus, your symptoms should go away on their own within 10 days. You may be given medicines to relieve symptoms. They include:  ? Medicines that shrink swollen nasal passages (topical intranasal decongestants).  ? Medicines that treat allergies (antihistamines).  ? A spray that eases inflammation of the nostrils (topical intranasal corticosteroids).  ? Rinses that help get rid of thick mucus in your nose (nasal saline washes).  · If caused by bacteria, your health care provider may recommend waiting to see if your symptoms improve. Most bacterial infections will get better without antibiotic medicine. You may be given antibiotics if you have:  ? A severe infection.  ? A weak immune system.  · If caused by narrow nasal passages or nasal polyps, you may need to have surgery.  Follow these instructions at home:  Medicines  · Take, use, or apply over-the-counter and prescription medicines only as told by your health care provider. These may include nasal sprays.  · If you were prescribed an antibiotic medicine, take it as told by your health care provider. Do not stop taking the antibiotic even if you start to feel better.  Hydrate and humidify    · Drink enough fluid to keep your urine pale yellow. Staying hydrated will help to thin your mucus.  · Use a cool mist humidifier to keep the humidity level in your home above 50%.  · Inhale steam for 10-15 minutes, 3-4 times a day, or as told by your health care provider. You can do this in the bathroom while a hot shower is  running.  · Limit your exposure to cool or dry air.  Rest  · Rest as much as possible.  · Sleep with your head raised (elevated).  · Make sure you get enough sleep each night.  General instructions    · Apply a warm, moist washcloth to your face 3-4 times a day or as told by your health care provider. This will help with discomfort.  · Wash your hands often with soap and water to reduce your exposure to germs. If soap and water are not available, use hand .  · Do not smoke. Avoid being around people who are smoking (secondhand smoke).  · Keep all follow-up visits as told by your health care provider. This is important.  Contact a health care provider if:  · You have a fever.  · Your symptoms get worse.  · Your symptoms do not improve within 10 days.  Get help right away if:  · You have a severe headache.  · You have persistent vomiting.  · You have severe pain or swelling around your face or eyes.  · You have vision problems.  · You develop confusion.  · Your neck is stiff.  · You have trouble breathing.  Summary  · Sinusitis is soreness and inflammation of your sinuses. Sinuses are hollow spaces in the bones around your face.  · This condition is caused by nasal tissues that become inflamed or swollen. The swelling traps or blocks the flow of mucus. This allows bacteria, viruses, and fungi to grow, which leads to infection.  · If you were prescribed an antibiotic medicine, take it as told by your health care provider. Do not stop taking the antibiotic even if you start to feel better.  · Keep all follow-up visits as told by your health care provider. This is important.  This information is not intended to replace advice given to you by your health care provider. Make sure you discuss any questions you have with your health care provider.  Document Revised: 05/20/2019 Document Reviewed: 05/20/2019  [employer] Patient Education © 2020 [employer]      Otitis Media, Adult    Otitis media occurs when there is  inflammation and fluid in the middle ear. Your middle ear is a part of the ear that contains bones for hearing as well as air that helps send sounds to your brain.  What are the causes?  This condition is caused by a blockage in the eustachian tube. This tube drains fluid from the ear to the back of the nose (nasopharynx). A blockage in this tube can be caused by an object or by swelling (edema) in the tube. Problems that can cause a blockage include:  · A cold or other upper respiratory infection.  · Allergies.  · An irritant, such as tobacco smoke.  · Enlarged adenoids. The adenoids are areas of soft tissue located high in the back of the throat, behind the nose and the roof of the mouth.  · A mass in the nasopharynx.  · Damage to the ear caused by pressure changes (barotrauma).  What are the signs or symptoms?  Symptoms of this condition include:  · Ear pain.  · A fever.  · Decreased hearing.  · A headache.  · Tiredness (lethargy).  · Fluid leaking from the ear.  · Ringing in the ear.  How is this diagnosed?  This condition is diagnosed with a physical exam. During the exam your health care provider will use an instrument called an otoscope to look into your ear and check for redness, swelling, and fluid. He or she will also ask about your symptoms.  Your health care provider may also order tests, such as:  · A test to check the movement of the eardrum (pneumatic otoscopy). This test is done by squeezing a small amount of air into the ear.  · A test that changes air pressure in the middle ear to check how well the eardrum moves and whether the eustachian tube is working (tympanogram).  How is this treated?  This condition usually goes away on its own within 3-5 days. But if the condition is caused by a bacteria infection and does not go away own its own, or keeps coming back, your health care provider may:  · Prescribe antibiotic medicines to treat the infection.  · Prescribe or recommend medicines to control  pain.  Follow these instructions at home:  · Take over-the-counter and prescription medicines only as told by your health care provider.  · If you were prescribed an antibiotic medicine, take it as told by your health care provider. Do not stop taking the antibiotic even if you start to feel better.  · Keep all follow-up visits as told by your health care provider. This is important.  Contact a health care provider if:  · You have bleeding from your nose.  · There is a lump on your neck.  · You are not getting better in 5 days.  · You feel worse instead of better.  Get help right away if:  · You have severe pain that is not controlled with medicine.  · You have swelling, redness, or pain around your ear.  · You have stiffness in your neck.  · A part of your face is paralyzed.  · The bone behind your ear (mastoid) is tender when you touch it.  · You develop a severe headache.  Summary  · Otitis media is redness, soreness, and swelling of the middle ear.  · This condition usually goes away on its own within 3-5 days.  · If the problem does not go away in 3-5 days, your health care provider may prescribe or recommend medicines to treat your symptoms.  · If you were prescribed an antibiotic medicine, take it as told by your health care provider.  This information is not intended to replace advice given to you by your health care provider. Make sure you discuss any questions you have with your health care provider.  Document Revised: 11/30/2018 Document Reviewed: 12/08/2017  [employer]vier Patient Education © 2020 [employer]

## 2021-01-04 ENCOUNTER — OFFICE VISIT (OUTPATIENT)
Dept: FAMILY MEDICINE CLINIC | Facility: CLINIC | Age: 26
End: 2021-01-04

## 2021-01-04 VITALS
SYSTOLIC BLOOD PRESSURE: 130 MMHG | DIASTOLIC BLOOD PRESSURE: 80 MMHG | HEIGHT: 67 IN | RESPIRATION RATE: 20 BRPM | WEIGHT: 278 LBS | BODY MASS INDEX: 43.63 KG/M2 | TEMPERATURE: 97.8 F | OXYGEN SATURATION: 97 % | HEART RATE: 68 BPM

## 2021-01-04 DIAGNOSIS — F41.9 ANXIETY: ICD-10-CM

## 2021-01-04 DIAGNOSIS — R42 LIGHTHEADEDNESS: Primary | ICD-10-CM

## 2021-01-04 DIAGNOSIS — E55.9 VITAMIN D DEFICIENCY: ICD-10-CM

## 2021-01-04 DIAGNOSIS — D50.9 IRON DEFICIENCY ANEMIA, UNSPECIFIED IRON DEFICIENCY ANEMIA TYPE: ICD-10-CM

## 2021-01-04 LAB
GLUCOSE BLDC GLUCOMTR-MCNC: 113 MG/DL (ref 70–130)
HBA1C MFR BLD: 5.7 %

## 2021-01-04 PROCEDURE — 99214 OFFICE O/P EST MOD 30 MIN: CPT | Performed by: NURSE PRACTITIONER

## 2021-01-04 PROCEDURE — 83036 HEMOGLOBIN GLYCOSYLATED A1C: CPT | Performed by: NURSE PRACTITIONER

## 2021-01-04 PROCEDURE — 82962 GLUCOSE BLOOD TEST: CPT | Performed by: NURSE PRACTITIONER

## 2021-01-04 NOTE — PROGRESS NOTES
"Chief Complaint  No chief complaint on file.    Subjective          Osiel Camargo presents to [facility] for   [facility]    PCP: CAMPBELL Byers    CC: \"lightheadedness\"    Concerned about her blood sugar and wants to be checked for diabetes.  Hx of anemia, Vit D deficiency with recent labs.  Currently on Vitamin D weekly and ferrous sulfate BID.  Still having intermittent lightheadedness.       Dizziness  This is a recurrent problem. The current episode started more than 1 month ago. The problem occurs intermittently. The problem has been waxing and waning. Associated symptoms include fatigue. Pertinent negatives include no abdominal pain, anorexia, arthralgias, change in bowel habit, chest pain, chills, congestion, coughing, diaphoresis, fever, headaches, joint swelling, myalgias, nausea, neck pain, numbness, rash, sore throat, swollen glands, urinary symptoms, vertigo, visual change, vomiting or weakness. Nothing (reports episodes are random) aggravates the symptoms. Treatments tried: has been treated for anxiety with Hydroxyzine which does help some; also treated for sinus infection, but didn't finish all antibiotics, but sinus symptoms have resolved. The treatment provided mild relief.       Objective   Vital Signs:   /80 (BP Location: Left arm, Patient Position: Sitting, Cuff Size: Adult)   Pulse 68   Temp 97.8 °F (36.6 °C) (Temporal)   Resp 20   Ht 170.2 cm (67\")   Wt 126 kg (278 lb)   SpO2 97%   BMI 43.54 kg/m²     Physical Exam  Constitutional:       General: She is not in acute distress.     Appearance: Normal appearance. She is obese. She is not ill-appearing.   HENT:      Head: Normocephalic and atraumatic.      Right Ear: Tympanic membrane and ear canal normal.      Left Ear: Tympanic membrane normal.      Nose: Nose normal.      Comments: No sinus TTP     Mouth/Throat:      Mouth: Mucous membranes are moist.      Pharynx: " Oropharynx is clear. No oropharyngeal exudate.   Eyes:      General:         Right eye: No discharge.         Left eye: No discharge.      Conjunctiva/sclera: Conjunctivae normal.      Pupils: Pupils are equal, round, and reactive to light.   Neck:      Musculoskeletal: Neck supple.   Cardiovascular:      Rate and Rhythm: Normal rate and regular rhythm.   Pulmonary:      Effort: Pulmonary effort is normal. No respiratory distress.      Breath sounds: Normal breath sounds. No wheezing, rhonchi or rales.   Lymphadenopathy:      Cervical: No cervical adenopathy.   Skin:     General: Skin is warm and dry.   Neurological:      General: No focal deficit present.      Mental Status: She is alert and oriented to person, place, and time.   Psychiatric:         Mood and Affect: Mood is anxious.        Result Review :            Recent Results (from the past 5000 hour(s))   Comprehensive metabolic panel    Collection Time: 08/18/20  3:43 PM    Specimen: Blood   Result Value Ref Range    Glucose 91 65 - 99 mg/dL    BUN 9 6 - 20 mg/dL    Creatinine 0.75 0.57 - 1.00 mg/dL    Sodium 140 136 - 145 mmol/L    Potassium 4.0 3.5 - 5.2 mmol/L    Chloride 103 98 - 107 mmol/L    CO2 25.2 22.0 - 29.0 mmol/L    Calcium 9.2 8.6 - 10.5 mg/dL    Total Protein 7.7 6.0 - 8.5 g/dL    Albumin 4.30 3.50 - 5.20 g/dL    ALT (SGPT) 14 1 - 33 U/L    AST (SGOT) 15 1 - 32 U/L    Alkaline Phosphatase 70 39 - 117 U/L    Total Bilirubin 0.2 0.0 - 1.2 mg/dL    eGFR  African Amer 115 >60 mL/min/1.73    Globulin 3.4 gm/dL    A/G Ratio 1.3 g/dL    BUN/Creatinine Ratio 12.0 7.0 - 25.0    Anion Gap 11.8 5.0 - 15.0 mmol/L   TSH    Collection Time: 08/18/20  3:43 PM    Specimen: Blood   Result Value Ref Range    TSH 0.336 0.270 - 4.200 uIU/mL   Vitamin D 25 Hydroxy    Collection Time: 08/18/20  3:43 PM    Specimen: Blood   Result Value Ref Range    25 Hydroxy, Vitamin D 14.5 (L) 30.0 - 100.0 ng/ml   CBC No Differential    Collection Time: 10/07/20  2:56 PM     Specimen: Blood   Result Value Ref Range    WBC 5.60 3.40 - 10.80 10*3/mm3    RBC 4.64 3.77 - 5.28 10*6/mm3    Hemoglobin 10.3 (L) 12.0 - 15.9 g/dL    Hematocrit 33.1 (L) 34.0 - 46.6 %    MCV 71.3 (L) 79.0 - 97.0 fL    MCH 22.2 (L) 26.6 - 33.0 pg    MCHC 31.1 (L) 31.5 - 35.7 g/dL    RDW 17.5 (H) 12.3 - 15.4 %    RDW-SD 43.7 37.0 - 54.0 fl    MPV 12.1 (H) 6.0 - 12.0 fL    Platelets 351 140 - 450 10*3/mm3   Iron Profile    Collection Time: 10/07/20  2:56 PM    Specimen: Blood   Result Value Ref Range    Iron 46 37 - 145 mcg/dL    Iron Saturation 9 (L) 20 - 50 %    Transferrin 348 200 - 360 mg/dL    TIBC 519 298 - 536 mcg/dL   Vitamin B12    Collection Time: 10/07/20  2:56 PM    Specimen: Blood   Result Value Ref Range    Vitamin B-12 670 211 - 946 pg/mL   Folate    Collection Time: 10/07/20  2:56 PM    Specimen: Blood   Result Value Ref Range    Folate 8.78 4.78 - 24.20 ng/mL   POCT hemoglobin    Collection Time: 10/07/20  5:32 PM    Specimen: Blood   Result Value Ref Range    Hemoglobin 10.9 (A) 12.0 - 17.0 g/dL   CBC Auto Differential    Collection Time: 12/01/20  1:57 PM    Specimen: Blood   Result Value Ref Range    WBC 5.15 3.40 - 10.80 10*3/mm3    RBC 4.63 3.77 - 5.28 10*6/mm3    Hemoglobin 10.4 (L) 12.0 - 15.9 g/dL    Hematocrit 33.6 (L) 34.0 - 46.6 %    MCV 72.6 (L) 79.0 - 97.0 fL    MCH 22.5 (L) 26.6 - 33.0 pg    MCHC 31.0 (L) 31.5 - 35.7 g/dL    RDW 16.1 (H) 12.3 - 15.4 %    RDW-SD 40.9 37.0 - 54.0 fl    MPV 11.9 6.0 - 12.0 fL    Platelets 329 140 - 450 10*3/mm3   Manual Differential    Collection Time: 12/01/20  1:57 PM    Specimen: Blood   Result Value Ref Range    Neutrophil % 28.0 (L) 42.7 - 76.0 %    Lymphocyte % 60.0 (H) 19.6 - 45.3 %    Monocyte % 9.0 5.0 - 12.0 %    Eosinophil % 3.0 0.3 - 6.2 %    Neutrophils Absolute 1.44 (L) 1.70 - 7.00 10*3/mm3    Lymphocytes Absolute 3.09 0.70 - 3.10 10*3/mm3    Monocytes Absolute 0.46 0.10 - 0.90 10*3/mm3    Eosinophils Absolute 0.15 0.00 - 0.40 10*3/mm3    Arnaldo  Cells Mod/2+ None Seen    Helmet Cells Slight/1+ None Seen    Target Cells Mod/2+ None Seen    WBC Morphology Normal Normal    Platelet Morphology Normal Normal   POCT hemoglobin    Collection Time: 12/23/20  3:23 PM    Specimen: Blood   Result Value Ref Range    Hemoglobin 10.9 (A) 12.0 - 17.0 g/dL   POC Glucose    Collection Time: 01/04/21  5:46 PM    Specimen: Blood   Result Value Ref Range    Glucose 113 70 - 130 mg/dL   POC Glycosylated Hemoglobin (Hb A1C)    Collection Time: 01/04/21  5:47 PM    Specimen: Blood   Result Value Ref Range    Hemoglobin A1C 5.7 %                Assessment and Plan    Problem List Items Addressed This Visit        Endocrine and Metabolic    Vitamin D deficiency (Chronic)       Hematology and Neoplasia    Iron deficiency anemia (Chronic)       Mental Health    Anxiety      Other Visit Diagnoses     Lightheadedness    -  Primary    Relevant Orders    POC Glucose (Completed)    POC Glycosylated Hemoglobin (Hb A1C) (Completed)        Continue with Vitamin D, Ferrous sulfate as prescribed  Continue with Hydroxyzine PRN for anxiety    Patient's Body mass index is 43.54 kg/m². BMI is above normal parameters. Recommendations include: referral to primary care.  Discussed BMI, 1800 calorie and exercise encouraged    See PCP or RTC if symptoms persist/worsen  See PCP for routine f/u visit and management of chronic medical conditions      This document has been electronically signed by CAMPBELL Hernadez on January 4, 2021 17:57 CST,.

## 2021-01-25 ENCOUNTER — TELEPHONE (OUTPATIENT)
Dept: FAMILY MEDICINE CLINIC | Facility: CLINIC | Age: 26
End: 2021-01-25

## 2021-01-26 ENCOUNTER — OFFICE VISIT (OUTPATIENT)
Dept: FAMILY MEDICINE CLINIC | Facility: CLINIC | Age: 26
End: 2021-01-26

## 2021-01-26 ENCOUNTER — LAB (OUTPATIENT)
Dept: LAB | Facility: HOSPITAL | Age: 26
End: 2021-01-26

## 2021-01-26 VITALS
RESPIRATION RATE: 22 BRPM | TEMPERATURE: 97.8 F | BODY MASS INDEX: 44.1 KG/M2 | OXYGEN SATURATION: 98 % | WEIGHT: 281 LBS | SYSTOLIC BLOOD PRESSURE: 126 MMHG | HEIGHT: 67 IN | HEART RATE: 71 BPM | DIASTOLIC BLOOD PRESSURE: 71 MMHG

## 2021-01-26 DIAGNOSIS — D50.9 IRON DEFICIENCY ANEMIA, UNSPECIFIED IRON DEFICIENCY ANEMIA TYPE: Primary | ICD-10-CM

## 2021-01-26 DIAGNOSIS — E55.9 VITAMIN D DEFICIENCY: ICD-10-CM

## 2021-01-26 LAB
ANISOCYTOSIS BLD QL: ABNORMAL
BASOPHILS # BLD MANUAL: 0.05 10*3/MM3 (ref 0–0.2)
BASOPHILS NFR BLD AUTO: 1 % (ref 0–1.5)
DEPRECATED RDW RBC AUTO: 41.2 FL (ref 37–54)
EOSINOPHIL # BLD MANUAL: 0.1 10*3/MM3 (ref 0–0.4)
EOSINOPHIL NFR BLD MANUAL: 2 % (ref 0.3–6.2)
ERYTHROCYTE [DISTWIDTH] IN BLOOD BY AUTOMATED COUNT: 15.7 % (ref 12.3–15.4)
HCT VFR BLD AUTO: 32.5 % (ref 34–46.6)
HGB BLD-MCNC: 10 G/DL (ref 12–15.9)
HYPOCHROMIA BLD QL: ABNORMAL
LYMPHOCYTES # BLD MANUAL: 2.54 10*3/MM3 (ref 0.7–3.1)
LYMPHOCYTES NFR BLD MANUAL: 50 % (ref 19.6–45.3)
LYMPHOCYTES NFR BLD MANUAL: 9 % (ref 5–12)
MCH RBC QN AUTO: 22.9 PG (ref 26.6–33)
MCHC RBC AUTO-ENTMCNC: 30.8 G/DL (ref 31.5–35.7)
MCV RBC AUTO: 74.5 FL (ref 79–97)
MICROCYTES BLD QL: ABNORMAL
MONOCYTES # BLD AUTO: 0.46 10*3/MM3 (ref 0.1–0.9)
NEUTROPHILS # BLD AUTO: 1.93 10*3/MM3 (ref 1.7–7)
NEUTROPHILS NFR BLD MANUAL: 38 % (ref 42.7–76)
NRBC BLD AUTO-RTO: 0 /100 WBC (ref 0–0.2)
PLAT MORPH BLD: NORMAL
PLATELET # BLD AUTO: 325 10*3/MM3 (ref 140–450)
PMV BLD AUTO: 12.2 FL (ref 6–12)
RBC # BLD AUTO: 4.36 10*6/MM3 (ref 3.77–5.28)
WBC # BLD AUTO: 5.08 10*3/MM3 (ref 3.4–10.8)
WBC MORPH BLD: NORMAL

## 2021-01-26 PROCEDURE — 83540 ASSAY OF IRON: CPT | Performed by: NURSE PRACTITIONER

## 2021-01-26 PROCEDURE — 85025 COMPLETE CBC W/AUTO DIFF WBC: CPT | Performed by: NURSE PRACTITIONER

## 2021-01-26 PROCEDURE — 99213 OFFICE O/P EST LOW 20 MIN: CPT | Performed by: NURSE PRACTITIONER

## 2021-01-26 PROCEDURE — 82306 VITAMIN D 25 HYDROXY: CPT | Performed by: NURSE PRACTITIONER

## 2021-01-26 PROCEDURE — 85007 BL SMEAR W/DIFF WBC COUNT: CPT | Performed by: NURSE PRACTITIONER

## 2021-01-26 PROCEDURE — 84466 ASSAY OF TRANSFERRIN: CPT | Performed by: NURSE PRACTITIONER

## 2021-01-26 RX ORDER — ERGOCALCIFEROL 1.25 MG/1
50000 CAPSULE ORAL WEEKLY
Qty: 5 CAPSULE | Refills: 3 | Status: SHIPPED | OUTPATIENT
Start: 2021-01-26 | End: 2021-06-10 | Stop reason: SDUPTHER

## 2021-01-26 RX ORDER — FERROUS SULFATE 324(65)MG
324 TABLET, DELAYED RELEASE (ENTERIC COATED) ORAL
Qty: 30 TABLET | Refills: 0 | Status: SHIPPED | OUTPATIENT
Start: 2021-01-26 | End: 2021-06-10 | Stop reason: SDUPTHER

## 2021-01-27 ENCOUNTER — TELEPHONE (OUTPATIENT)
Dept: FAMILY MEDICINE CLINIC | Facility: CLINIC | Age: 26
End: 2021-01-27

## 2021-01-27 LAB
25(OH)D3 SERPL-MCNC: 24.8 NG/ML (ref 30–100)
IRON 24H UR-MRATE: 53 MCG/DL (ref 37–145)
IRON SATN MFR SERPL: 13 % (ref 20–50)
TIBC SERPL-MCNC: 423 MCG/DL (ref 298–536)
TRANSFERRIN SERPL-MCNC: 284 MG/DL (ref 200–360)

## 2021-01-27 NOTE — TELEPHONE ENCOUNTER
Caller: Osiel Camargo    Relationship: Self    Best call back number: [phone]    Caller requesting test results: PATIENT    What test was performed: LABS    When was the test performed: 01/26/21    Where was the test performed: IN OFFICE

## 2021-02-01 NOTE — PROGRESS NOTES
Her cbc looks a little worse than last time.  Her vit d is better.  If she is taking her iron we need to send her to hematology.

## 2021-02-02 ENCOUNTER — TELEPHONE (OUTPATIENT)
Dept: FAMILY MEDICINE CLINIC | Facility: CLINIC | Age: 26
End: 2021-02-02

## 2021-02-02 DIAGNOSIS — D50.9 IRON DEFICIENCY ANEMIA, UNSPECIFIED IRON DEFICIENCY ANEMIA TYPE: Primary | ICD-10-CM

## 2021-02-02 NOTE — TELEPHONE ENCOUNTER
----- Message from CAMPBELL Key sent at 2/1/2021  3:43 PM CST -----  Her cbc looks a little worse than last time.  Her vit d is better.  If she is taking her iron we need to send her to hematology.

## 2021-02-03 ENCOUNTER — TELEPHONE (OUTPATIENT)
Dept: FAMILY MEDICINE CLINIC | Facility: CLINIC | Age: 26
End: 2021-02-03

## 2021-02-08 NOTE — PATIENT INSTRUCTIONS
Calorie Counting for Weight Loss  Calories are units of energy. Your body needs a certain amount of calories from food to keep you going throughout the day. When you eat more calories than your body needs, your body stores the extra calories as fat. When you eat fewer calories than your body needs, your body burns fat to get the energy it needs.  Calorie counting means keeping track of how many calories you eat and drink each day. Calorie counting can be helpful if you need to lose weight. If you make sure to eat fewer calories than your body needs, you should lose weight. Ask your health care provider what a healthy weight is for you.  For calorie counting to work, you will need to eat the right number of calories in a day in order to lose a healthy amount of weight per week. A dietitian can help you determine how many calories you need in a day and will give you suggestions on how to reach your calorie goal.  · A healthy amount of weight to lose per week is usually 1-2 lb (0.5-0.9 kg). This usually means that your daily calorie intake should be reduced by 500-750 calories.  · Eating 1,200 - 1,500 calories per day can help most women lose weight.  · Eating 1,500 - 1,800 calories per day can help most men lose weight.  What is my plan?  My goal is to have __________ calories per day.  If I have this many calories per day, I should lose around __________ pounds per week.  What do I need to know about calorie counting?  In order to meet your daily calorie goal, you will need to:  · Find out how many calories are in each food you would like to eat. Try to do this before you eat.  · Decide how much of the food you plan to eat.  · Write down what you ate and how many calories it had. Doing this is called keeping a food log.  To successfully lose weight, it is important to balance calorie counting with a healthy lifestyle that includes regular activity. Aim for 150 minutes of moderate exercise (such as walking) or 75  minutes of vigorous exercise (such as running) each week.  Where do I find calorie information?    The number of calories in a food can be found on a Nutrition Facts label. If a food does not have a Nutrition Facts label, try to look up the calories online or ask your dietitian for help.  Remember that calories are listed per serving. If you choose to have more than one serving of a food, you will have to multiply the calories per serving by the amount of servings you plan to eat. For example, the label on a package of bread might say that a serving size is 1 slice and that there are 90 calories in a serving. If you eat 1 slice, you will have eaten 90 calories. If you eat 2 slices, you will have eaten 180 calories.  How do I keep a food log?  Immediately after each meal, record the following information in your food log:  · What you ate. Don't forget to include toppings, sauces, and other extras on the food.  · How much you ate. This can be measured in cups, ounces, or number of items.  · How many calories each food and drink had.  · The total number of calories in the meal.  Keep your food log near you, such as in a small notebook in your pocket, or use a mobile justine or website. Some programs will calculate calories for you and show you how many calories you have left for the day to meet your goal.  What are some calorie counting tips?    · Use your calories on foods and drinks that will fill you up and not leave you hungry:  ? Some examples of foods that fill you up are nuts and nut butters, vegetables, lean proteins, and high-fiber foods like whole grains. High-fiber foods are foods with more than 5 g fiber per serving.  ? Drinks such as sodas, specialty coffee drinks, alcohol, and juices have a lot of calories, yet do not fill you up.  · Eat nutritious foods and avoid empty calories. Empty calories are calories you get from foods or beverages that do not have many vitamins or protein, such as candy, sweets, and  "soda. It is better to have a nutritious high-calorie food (such as an avocado) than a food with few nutrients (such as a bag of chips).  · Know how many calories are in the foods you eat most often. This will help you calculate calorie counts faster.  · Pay attention to calories in drinks. Low-calorie drinks include water and unsweetened drinks.  · Pay attention to nutrition labels for \"low fat\" or \"fat free\" foods. These foods sometimes have the same amount of calories or more calories than the full fat versions. They also often have added sugar, starch, or salt, to make up for flavor that was removed with the fat.  · Find a way of tracking calories that works for you. Get creative. Try different apps or programs if writing down calories does not work for you.  What are some portion control tips?  · Know how many calories are in a serving. This will help you know how many servings of a certain food you can have.  · Use a measuring cup to measure serving sizes. You could also try weighing out portions on a kitchen scale. With time, you will be able to estimate serving sizes for some foods.  · Take some time to put servings of different foods on your favorite plates, bowls, and cups so you know what a serving looks like.  · Try not to eat straight from a bag or box. Doing this can lead to overeating. Put the amount you would like to eat in a cup or on a plate to make sure you are eating the right portion.  · Use smaller plates, glasses, and bowls to prevent overeating.  · Try not to multitask (for example, watch TV or use your computer) while eating. If it is time to eat, sit down at a table and enjoy your food. This will help you to know when you are full. It will also help you to be aware of what you are eating and how much you are eating.  What are tips for following this plan?  Reading food labels  · Check the calorie count compared to the serving size. The serving size may be smaller than what you are used to " "eating.  · Check the source of the calories. Make sure the food you are eating is high in vitamins and protein and low in saturated and trans fats.  Shopping  · Read nutrition labels while you shop. This will help you make healthy decisions before you decide to purchase your food.  · Make a grocery list and stick to it.  Cooking  · Try to cook your favorite foods in a healthier way. For example, try baking instead of frying.  · Use low-fat dairy products.  Meal planning  · Use more fruits and vegetables. Half of your plate should be fruits and vegetables.  · Include lean proteins like poultry and fish.  How do I count calories when eating out?  · Ask for smaller portion sizes.  · Consider sharing an entree and sides instead of getting your own entree.  · If you get your own entree, eat only half. Ask for a box at the beginning of your meal and put the rest of your entree in it so you are not tempted to eat it.  · If calories are listed on the menu, choose the lower calorie options.  · Choose dishes that include vegetables, fruits, whole grains, low-fat dairy products, and lean protein.  · Choose items that are boiled, broiled, grilled, or steamed. Stay away from items that are buttered, battered, fried, or served with cream sauce. Items labeled \"crispy\" are usually fried, unless stated otherwise.  · Choose water, low-fat milk, unsweetened iced tea, or other drinks without added sugar. If you want an alcoholic beverage, choose a lower calorie option such as a glass of wine or light beer.  · Ask for dressings, sauces, and syrups on the side. These are usually high in calories, so you should limit the amount you eat.  · If you want a salad, choose a garden salad and ask for grilled meats. Avoid extra toppings like mae, cheese, or fried items. Ask for the dressing on the side, or ask for olive oil and vinegar or lemon to use as dressing.  · Estimate how many servings of a food you are given. For example, a serving of " cooked rice is ½ cup or about the size of half a baseball. Knowing serving sizes will help you be aware of how much food you are eating at restaurants. The list below tells you how big or small some common portion sizes are based on everyday objects:  ? 1 oz--4 stacked dice.  ? 3 oz--1 deck of cards.  ? 1 tsp--1 die.  ? 1 Tbsp--½ a ping-pong ball.  ? 2 Tbsp--1 ping-pong ball.  ? ½ cup--½ baseball.  ? 1 cup--1 baseball.  Summary  · Calorie counting means keeping track of how many calories you eat and drink each day. If you eat fewer calories than your body needs, you should lose weight.  · A healthy amount of weight to lose per week is usually 1-2 lb (0.5-0.9 kg). This usually means reducing your daily calorie intake by 500-750 calories.  · The number of calories in a food can be found on a Nutrition Facts label. If a food does not have a Nutrition Facts label, try to look up the calories online or ask your dietitian for help.  · Use your calories on foods and drinks that will fill you up, and not on foods and drinks that will leave you hungry.  · Use smaller plates, glasses, and bowls to prevent overeating.  This information is not intended to replace advice given to you by your health care provider. Make sure you discuss any questions you have with your health care provider.  Document Revised: 09/06/2019 Document Reviewed: 11/17/2017  [employer] Patient Education © 2020 [employer]      Exercising to Lose Weight  Exercise is structured, repetitive physical activity to improve fitness and health. Getting regular exercise is important for everyone. It is especially important if you are overweight. Being overweight increases your risk of heart disease, stroke, diabetes, high blood pressure, and several types of cancer. Reducing your calorie intake and exercising can help you lose weight.  Exercise is usually categorized as moderate or vigorous intensity. To lose weight, most people need to do a certain amount of  moderate-intensity or vigorous-intensity exercise each week.  Moderate-intensity exercise    Moderate-intensity exercise is any activity that gets you moving enough to burn at least three times more energy (calories) than if you were sitting.  Examples of moderate exercise include:  · Walking a mile in 15 minutes.  · Doing light yard work.  · Biking at an easy pace.  Most people should get at least 150 minutes (2 hours and 30 minutes) a week of moderate-intensity exercise to maintain their body weight.  Vigorous-intensity exercise  Vigorous-intensity exercise is any activity that gets you moving enough to burn at least six times more calories than if you were sitting. When you exercise at this intensity, you should be working hard enough that you are not able to carry on a conversation.  Examples of vigorous exercise include:  · Running.  · Playing a team sport, such as football, basketball, and soccer.  · Jumping rope.  Most people should get at least 75 minutes (1 hour and 15 minutes) a week of vigorous-intensity exercise to maintain their body weight.  How can exercise affect me?  When you exercise enough to burn more calories than you eat, you lose weight. Exercise also reduces body fat and builds muscle. The more muscle you have, the more calories you burn. Exercise also:  · Improves mood.  · Reduces stress and tension.  · Improves your overall fitness, flexibility, and endurance.  · Increases bone strength.  The amount of exercise you need to lose weight depends on:  · Your age.  · The type of exercise.  · Any health conditions you have.  · Your overall physical ability.  Talk to your health care provider about how much exercise you need and what types of activities are safe for you.  What actions can I take to lose weight?  Nutrition    · Make changes to your diet as told by your health care provider or diet and nutrition specialist (dietitian). This may include:  ? Eating fewer calories.  ? Eating more  protein.  ? Eating less unhealthy fats.  ? Eating a diet that includes fresh fruits and vegetables, whole grains, low-fat dairy products, and lean protein.  ? Avoiding foods with added fat, salt, and sugar.  · Drink plenty of water while you exercise to prevent dehydration or heat stroke.  Activity  · Choose an activity that you enjoy and set realistic goals. Your health care provider can help you make an exercise plan that works for you.  · Exercise at a moderate or vigorous intensity most days of the week.  ? The intensity of exercise may vary from person to person. You can tell how intense a workout is for you by paying attention to your breathing and heartbeat. Most people will notice their breathing and heartbeat get faster with more intense exercise.  · Do resistance training twice each week, such as:  ? Push-ups.  ? Sit-ups.  ? Lifting weights.  ? Using resistance bands.  · Getting short amounts of exercise can be just as helpful as long structured periods of exercise. If you have trouble finding time to exercise, try to include exercise in your daily routine.  ? Get up, stretch, and walk around every 30 minutes throughout the day.  ? Go for a walk during your lunch break.  ? Park your car farther away from your destination.  ? If you take public transportation, get off one stop early and walk the rest of the way.  ? Make phone calls while standing up and walking around.  ? Take the stairs instead of elevators or escalators.  · Wear comfortable clothes and shoes with good support.  · Do not exercise so much that you hurt yourself, feel dizzy, or get very short of breath.  Where to find more information  · [place] Department of Health and Human Services: www.hhs.gov  · Centers for Disease Control and Prevention (CDC): www.cdc.gov  Contact a health care provider:  · Before starting a new exercise program.  · If you have questions or concerns about your weight.  · If you have a medical problem that keeps you from  exercising.  Get help right away if you have any of the following while exercising:  · Injury.  · Dizziness.  · Difficulty breathing or shortness of breath that does not go away when you stop exercising.  · Chest pain.  · Rapid heartbeat.  Summary  · Being overweight increases your risk of heart disease, stroke, diabetes, high blood pressure, and several types of cancer.  · Losing weight happens when you burn more calories than you eat.  · Reducing the amount of calories you eat in addition to getting regular moderate or vigorous exercise each week helps you lose weight.  This information is not intended to replace advice given to you by your health care provider. Make sure you discuss any questions you have with your health care provider.  Document Revised: 12/31/2018 Document Reviewed: 12/31/2018  [employer] Patient Education © 2020 [employer]

## 2021-02-08 NOTE — PROGRESS NOTES
Subjective   Osiel Camargo is a 25 y.o. female.     Osiel Camargo 25 comes in today needing recheck on her anemia.  She has been taking iron twice a day.  She continues to be lightheaded needs to have it rechecked.  She also has a history of vitamin D deficiency and this time she is taking vitamin D to control this problem.    Anemia  Presents for follow-up visit. Symptoms include light-headedness and malaise/fatigue. There has been no abdominal pain, anorexia, bruising/bleeding easily, confusion, fever, leg swelling, pallor, palpitations, paresthesias, pica or weight loss. Signs of blood loss that are not present include hematemesis, hematochezia, melena, menorrhagia and vaginal bleeding. There are no compliance problems.  Compliance with medications is %.        The following portions of the patient's history were reviewed and updated as appropriate: allergies, current medications, past family history, past medical history, past social history, past surgical history and problem list.    Review of Systems   Constitutional: Positive for malaise/fatigue. Negative for fever and weight loss.   HENT: Negative.    Eyes: Negative.    Respiratory: Negative.    Cardiovascular: Negative.  Negative for palpitations.   Gastrointestinal: Negative.  Negative for abdominal pain, anorexia, hematemesis, hematochezia and melena.   Genitourinary: Negative.  Negative for menorrhagia and vaginal bleeding.   Musculoskeletal: Negative.    Skin: Negative.  Negative for pallor.   Neurological: Positive for light-headedness. Negative for paresthesias.   Hematological: Does not bruise/bleed easily.   Psychiatric/Behavioral: Negative.  Negative for confusion.       Objective   Physical Exam  Vitals signs and nursing note reviewed.   Constitutional:       General: She is not in acute distress.     Appearance: She is well-developed.   HENT:      Head: Normocephalic and atraumatic.      Right Ear: External ear normal.      Left Ear: External  ear normal.      Nose: Nose normal.      Mouth/Throat:      Pharynx: No oropharyngeal exudate.   Eyes:      Pupils: Pupils are equal, round, and reactive to light.   Neck:      Musculoskeletal: Normal range of motion and neck supple.      Thyroid: No thyromegaly.   Cardiovascular:      Rate and Rhythm: Normal rate and regular rhythm.      Heart sounds: Normal heart sounds. No murmur. No friction rub.   Pulmonary:      Effort: Pulmonary effort is normal. No respiratory distress.      Breath sounds: Normal breath sounds. No wheezing or rales.   Abdominal:      Palpations: Abdomen is soft.   Musculoskeletal: Normal range of motion.   Skin:     General: Skin is warm and dry.   Neurological:      Mental Status: She is alert and oriented to person, place, and time.   Psychiatric:         Thought Content: Thought content normal.         Assessment/Plan   Diagnoses and all orders for this visit:    1. Iron deficiency anemia, unspecified iron deficiency anemia type (Primary)  -     ferrous sulfate 324 MG tablet delayed-release; Take 1 tablet by mouth Daily With Breakfast.  Dispense: 30 tablet; Refill: 0  -     CBC & Differential  -     Iron and TIBC  -     CBC Auto Differential  -     Cancel: Manual Differential  -     Manual Differential    2. Vitamin D deficiency  -     vitamin D (ERGOCALCIFEROL) 1.25 MG (74215 UT) capsule capsule; Take 1 capsule by mouth 1 (One) Time Per Week.  Dispense: 5 capsule; Refill: 3  -     Vitamin D 25 hydroxy      bmi noted to be 45 which is considered obese.  Diet and exercise information will be provided the patient.  If her CBC does not look significantly improved will consider sending her to a hematologist.

## 2021-02-11 ENCOUNTER — APPOINTMENT (OUTPATIENT)
Dept: ONCOLOGY | Facility: CLINIC | Age: 26
End: 2021-02-11

## 2021-02-11 ENCOUNTER — APPOINTMENT (OUTPATIENT)
Dept: ONCOLOGY | Facility: HOSPITAL | Age: 26
End: 2021-02-11

## 2021-02-17 ENCOUNTER — TELEPHONE (OUTPATIENT)
Dept: FAMILY MEDICINE CLINIC | Facility: CLINIC | Age: 26
End: 2021-02-17

## 2021-02-17 DIAGNOSIS — D50.9 IRON DEFICIENCY ANEMIA, UNSPECIFIED IRON DEFICIENCY ANEMIA TYPE: ICD-10-CM

## 2021-02-17 RX ORDER — FERROUS SULFATE 324(65)MG
324 TABLET, DELAYED RELEASE (ENTERIC COATED) ORAL
Qty: 30 TABLET | Refills: 0 | Status: SHIPPED | OUTPATIENT
Start: 2021-02-17 | End: 2021-03-09

## 2021-02-17 RX ORDER — FERROUS SULFATE 324(65)MG
324 TABLET, DELAYED RELEASE (ENTERIC COATED) ORAL
Qty: 30 TABLET | Refills: 0 | Status: CANCELLED | OUTPATIENT
Start: 2021-02-17

## 2021-02-17 NOTE — TELEPHONE ENCOUNTER
Spoke with pharmacy.  Reports that she did pick it up on 1-.  New Rx is sent in case patient needs it.  She has already been referred to hematology for evaluation by PCP.

## 2021-02-17 NOTE — TELEPHONE ENCOUNTER
Can you please send in this ferrous sulfate 324 MG tablet delayed-release to [facility] please. She states that you were the one that told her take it twice a day. Also states that the pharmacy was suppose to have sent over a refill request. Don't see one in there. Would like it ASAP as she is going to [place] for the night and it has started snowing already

## 2021-02-18 DIAGNOSIS — D50.9 IRON DEFICIENCY ANEMIA, UNSPECIFIED IRON DEFICIENCY ANEMIA TYPE: ICD-10-CM

## 2021-02-18 RX ORDER — FERROUS SULFATE 325(65) MG
TABLET ORAL
Qty: 30 TABLET | Refills: 0 | Status: SHIPPED | OUTPATIENT
Start: 2021-02-18 | End: 2021-03-09

## 2021-03-05 ENCOUNTER — APPOINTMENT (OUTPATIENT)
Dept: ONCOLOGY | Facility: HOSPITAL | Age: 26
End: 2021-03-05

## 2021-03-05 ENCOUNTER — APPOINTMENT (OUTPATIENT)
Dept: ONCOLOGY | Facility: CLINIC | Age: 26
End: 2021-03-05

## 2021-03-08 ENCOUNTER — CONSULT (OUTPATIENT)
Dept: ONCOLOGY | Facility: CLINIC | Age: 26
End: 2021-03-08

## 2021-03-08 ENCOUNTER — LAB (OUTPATIENT)
Dept: ONCOLOGY | Facility: HOSPITAL | Age: 26
End: 2021-03-08

## 2021-03-08 VITALS
SYSTOLIC BLOOD PRESSURE: 139 MMHG | TEMPERATURE: 97.2 F | HEART RATE: 63 BPM | BODY MASS INDEX: 44.1 KG/M2 | HEIGHT: 67 IN | RESPIRATION RATE: 18 BRPM | DIASTOLIC BLOOD PRESSURE: 77 MMHG | WEIGHT: 281 LBS

## 2021-03-08 DIAGNOSIS — D50.9 IRON DEFICIENCY ANEMIA, UNSPECIFIED IRON DEFICIENCY ANEMIA TYPE: ICD-10-CM

## 2021-03-08 DIAGNOSIS — D50.9 IRON DEFICIENCY ANEMIA, UNSPECIFIED IRON DEFICIENCY ANEMIA TYPE: Chronic | ICD-10-CM

## 2021-03-08 LAB
BASOPHILS # BLD AUTO: 0.03 10*3/MM3 (ref 0–0.2)
BASOPHILS NFR BLD AUTO: 0.4 % (ref 0–1.5)
DEPRECATED RDW RBC AUTO: 46.1 FL (ref 37–54)
EOSINOPHIL # BLD AUTO: 0.1 10*3/MM3 (ref 0–0.4)
EOSINOPHIL NFR BLD AUTO: 1.4 % (ref 0.3–6.2)
ERYTHROCYTE [DISTWIDTH] IN BLOOD BY AUTOMATED COUNT: 16.8 % (ref 12.3–15.4)
FERRITIN SERPL-MCNC: 49.4 NG/ML (ref 13–150)
HCT VFR BLD AUTO: 32.6 % (ref 34–46.6)
HGB BLD-MCNC: 10.4 G/DL (ref 12–15.9)
IMM GRANULOCYTES # BLD AUTO: 0.01 10*3/MM3 (ref 0–0.05)
IMM GRANULOCYTES NFR BLD AUTO: 0.1 % (ref 0–0.5)
IRON 24H UR-MRATE: 33 MCG/DL (ref 37–145)
IRON SATN MFR SERPL: 8 % (ref 20–50)
LYMPHOCYTES # BLD AUTO: 2.66 10*3/MM3 (ref 0.7–3.1)
LYMPHOCYTES NFR BLD AUTO: 36.7 % (ref 19.6–45.3)
MCH RBC QN AUTO: 24.3 PG (ref 26.6–33)
MCHC RBC AUTO-ENTMCNC: 31.9 G/DL (ref 31.5–35.7)
MCV RBC AUTO: 76.2 FL (ref 79–97)
MONOCYTES # BLD AUTO: 0.58 10*3/MM3 (ref 0.1–0.9)
MONOCYTES NFR BLD AUTO: 8 % (ref 5–12)
NEUTROPHILS NFR BLD AUTO: 3.87 10*3/MM3 (ref 1.7–7)
NEUTROPHILS NFR BLD AUTO: 53.4 % (ref 42.7–76)
NRBC BLD AUTO-RTO: 0 /100 WBC (ref 0–0.2)
PLATELET # BLD AUTO: 304 10*3/MM3 (ref 140–450)
PMV BLD AUTO: 9.7 FL (ref 6–12)
RBC # BLD AUTO: 4.28 10*6/MM3 (ref 3.77–5.28)
TIBC SERPL-MCNC: 440 MCG/DL (ref 298–536)
TRANSFERRIN SERPL-MCNC: 295 MG/DL (ref 200–360)
WBC # BLD AUTO: 7.25 10*3/MM3 (ref 3.4–10.8)

## 2021-03-08 PROCEDURE — 82746 ASSAY OF FOLIC ACID SERUM: CPT | Performed by: INTERNAL MEDICINE

## 2021-03-08 PROCEDURE — G9903 PT SCRN TBCO ID AS NON USER: HCPCS | Performed by: INTERNAL MEDICINE

## 2021-03-08 PROCEDURE — 1126F AMNT PAIN NOTED NONE PRSNT: CPT | Performed by: INTERNAL MEDICINE

## 2021-03-08 PROCEDURE — 99204 OFFICE O/P NEW MOD 45 MIN: CPT | Performed by: INTERNAL MEDICINE

## 2021-03-08 PROCEDURE — G0463 HOSPITAL OUTPT CLINIC VISIT: HCPCS | Performed by: INTERNAL MEDICINE

## 2021-03-08 PROCEDURE — 85025 COMPLETE CBC W/AUTO DIFF WBC: CPT | Performed by: INTERNAL MEDICINE

## 2021-03-08 PROCEDURE — 83540 ASSAY OF IRON: CPT | Performed by: INTERNAL MEDICINE

## 2021-03-08 PROCEDURE — 82728 ASSAY OF FERRITIN: CPT | Performed by: INTERNAL MEDICINE

## 2021-03-08 PROCEDURE — 82607 VITAMIN B-12: CPT | Performed by: INTERNAL MEDICINE

## 2021-03-08 PROCEDURE — 84466 ASSAY OF TRANSFERRIN: CPT | Performed by: INTERNAL MEDICINE

## 2021-03-08 NOTE — PROGRESS NOTES
DATE OF CONSULT: 3/9/2021      REQUESTING SOURCE:  Emily Flores, APRN  [address]  [place] [address]      REASON FOR CONSULTATION: Microcytic anemia, fatigue      HISTORY OF PRESENT ILLNESS:    25-year-old female with medical problem consisting of anxiety, iron deficiency anemia that she has known for last 6 years since her first pregnancy but has not been taking any supplement with iron regularly has been referred to [facility] for further evaluation and recommendation regarding persistent iron deficiency anemia and fatigue.  Patient states she has been taking iron tablet for last few months, she feels better until her menstrual bleeding.  After her menstrual bleeding she again feels exhausted.  Denies any history of blood in stool.  Denies any history of blood transfusion.  Denies any history of sickle cell or thalassemia in family.  Denies any history of DVT or pulmonary embolism.        PAST MEDICAL HISTORY:    Past Medical History:   Diagnosis Date   • Anemia        PAST SURGICAL HISTORY:  History reviewed. No pertinent surgical history.    ALLERGIES:    No Known Allergies    SOCIAL HISTORY:   Social History     Tobacco Use   • Smoking status: Never Smoker   • Smokeless tobacco: Never Used   Substance Use Topics   • Alcohol use: Defer   • Drug use: Defer       CURRENT MEDICATIONS:    Current Outpatient Medications   Medication Sig Dispense Refill   • cefdinir (OMNICEF) 300 MG capsule Take 1 capsule by mouth 2 (Two) Times a Day. 20 capsule 0   • cetirizine (zyrTEC) 10 MG tablet Take 1 tablet by mouth Daily. 30 tablet 2   • ferrous sulfate 324 MG tablet delayed-release Take 1 tablet by mouth Daily With Breakfast. 30 tablet 0   • ferrous sulfate 325 (65 FE) MG tablet TAKE ONE TABLET BY MOUTH DAILY WITH BREAKFAST 30 tablet 0   • fluticasone (Flonase) 50 MCG/ACT nasal spray 2 sprays into the nostril(s) as directed by provider Daily. 16 g 2   • hydrOXYzine pamoate (Vistaril) 25 MG capsule  Take 1 capsule by mouth 3 (Three) Times a Day As Needed for Anxiety. 30 capsule 0   • polyethylene glycol (MIRALAX) 17 g packet Take 17 g by mouth Daily. 30 packet 3   • simethicone (Gas-X) 80 MG chewable tablet Chew 1 tablet Every 6 (Six) Hours As Needed for Flatulence. 30 tablet 3   • vitamin D (ERGOCALCIFEROL) 1.25 MG (18315 UT) capsule capsule Take 1 capsule by mouth 1 (One) Time Per Week. 5 capsule 3     No current facility-administered medications for this visit.        HOME MEDICATIONS:   Current Outpatient Medications on File Prior to Visit   Medication Sig Dispense Refill   • cefdinir (OMNICEF) 300 MG capsule Take 1 capsule by mouth 2 (Two) Times a Day. 20 capsule 0   • cetirizine (zyrTEC) 10 MG tablet Take 1 tablet by mouth Daily. 30 tablet 2   • ferrous sulfate 324 MG tablet delayed-release Take 1 tablet by mouth Daily With Breakfast. 30 tablet 0   • ferrous sulfate 325 (65 FE) MG tablet TAKE ONE TABLET BY MOUTH DAILY WITH BREAKFAST 30 tablet 0   • fluticasone (Flonase) 50 MCG/ACT nasal spray 2 sprays into the nostril(s) as directed by provider Daily. 16 g 2   • hydrOXYzine pamoate (Vistaril) 25 MG capsule Take 1 capsule by mouth 3 (Three) Times a Day As Needed for Anxiety. 30 capsule 0   • polyethylene glycol (MIRALAX) 17 g packet Take 17 g by mouth Daily. 30 packet 3   • simethicone (Gas-X) 80 MG chewable tablet Chew 1 tablet Every 6 (Six) Hours As Needed for Flatulence. 30 tablet 3   • vitamin D (ERGOCALCIFEROL) 1.25 MG (23780 UT) capsule capsule Take 1 capsule by mouth 1 (One) Time Per Week. 5 capsule 3   • [DISCONTINUED] ferrous sulfate 324 MG tablet delayed-release Take 1 tablet by mouth Daily With Breakfast. 30 tablet 0     No current facility-administered medications on file prior to visit.       FAMILY HISTORY:    No family history on file.    REVIEW OF SYSTEMS:        CONSTITUTIONAL:  Complains of fatigue.  Admits to recent weight gain.  Denies any fever, chills .     EYES: No visual  "disturbances. No discharge. No new lesions    ENMT:  No epistaxis, mouth sores or difficulty swallowing.    RESPIRATORY:  No new shortness of breath. No new cough or hemoptysis.    CARDIOVASCULAR: Complains of intermittent palpitation with anxiety attack.  No chest pain .    GASTROINTESTINAL:  No abdominal pain nausea, vomiting or blood in the stool.    GENITOURINARY: No Dysuria or Hematuria.    MUSCULOSKELETAL:  No new back pain or arthralgia..    LYMPHATICS:  Denies any abnormal swollen glands anywhere in the body.    NEUROLOGICAL : Complains of intermittent tingling and numbness affecting bilateral hands due to carpal tunnel syndrome. No headache or dizziness. No seizures or balance problems.    SKIN: No new skin lesions.    ENDOCRINE : No new hot or cold intolerance. No new polyuria . No polydipsia.        PHYSICAL EXAMINATION:      VITAL SIGNS:  /77   Pulse 63   Temp 97.2 °F (36.2 °C)   Resp 18   Ht 170.2 cm (67\")   Wt 127 kg (281 lb)   BMI 44.01 kg/m²       03/08/21  1506   Weight: 127 kg (281 lb)       ECOG performance status: 1    CONSTITUTIONAL:  Not in any distress.    EYES: Mild conjunctival Pallor. No Icterus. No Pterygium. Extraocular Movements intact.No ptosis.    ENMT:  Normocephalic, Atraumatic.No Facial Asymmetry noted.    NECK:  No adenopathy.Trachea midline. NO JVD.    RESPIRATORY:  Fair air entry bilateral. No rhonchi or wheezing.Fair respiratory effort.    CARDIOVASCULAR:  S1, S2. Regular rate and rhythm. No murmur or gallop appreciated.    ABDOMEN:  Soft, obese, nontender. Bowel sounds present in all four quadrants.  No Hepatosplenomegaly appreciated.    MUSCULOSKELETAL:  No edema.No Calf Tenderness.Normal range of motion.    NEUROLOGIC:    No  Motor or sensory deficit appreciated. Cranial Nerves 2-12 grossly intact.    SKIN : No new skin lesion identified. Skin is warm and dry to touch.    LYMPHATICS: No new enlarged lymph nodes in neck or supraclavicular area.    PSYCHIATRY: " Alert, awake and oriented ×3.Normal affect.  Normal judgment.  Makes good eye contact.          DIAGNOSTIC DATA:    WBC   Date Value Ref Range Status   03/08/2021 7.25 3.40 - 10.80 10*3/mm3 Final     RBC   Date Value Ref Range Status   03/08/2021 4.28 3.77 - 5.28 10*6/mm3 Final     Hemoglobin   Date Value Ref Range Status   03/08/2021 10.4 (L) 12.0 - 15.9 g/dL Final     Hematocrit   Date Value Ref Range Status   03/08/2021 32.6 (L) 34.0 - 46.6 % Final     MCV   Date Value Ref Range Status   03/08/2021 76.2 (L) 79.0 - 97.0 fL Final     MCH   Date Value Ref Range Status   03/08/2021 24.3 (L) 26.6 - 33.0 pg Final     MCHC   Date Value Ref Range Status   03/08/2021 31.9 31.5 - 35.7 g/dL Final     RDW   Date Value Ref Range Status   03/08/2021 16.8 (H) 12.3 - 15.4 % Final     RDW-SD   Date Value Ref Range Status   03/08/2021 46.1 37.0 - 54.0 fl Final     MPV   Date Value Ref Range Status   03/08/2021 9.7 6.0 - 12.0 fL Final     Platelets   Date Value Ref Range Status   03/08/2021 304 140 - 450 10*3/mm3 Final     Neutrophil %   Date Value Ref Range Status   03/08/2021 53.4 42.7 - 76.0 % Final     Lymphocyte %   Date Value Ref Range Status   03/08/2021 36.7 19.6 - 45.3 % Final     Monocyte %   Date Value Ref Range Status   03/08/2021 8.0 5.0 - 12.0 % Final     Eosinophil %   Date Value Ref Range Status   03/08/2021 1.4 0.3 - 6.2 % Final     Basophil %   Date Value Ref Range Status   03/08/2021 0.4 0.0 - 1.5 % Final     Immature Grans %   Date Value Ref Range Status   03/08/2021 0.1 0.0 - 0.5 % Final     Neutrophils, Absolute   Date Value Ref Range Status   03/08/2021 3.87 1.70 - 7.00 10*3/mm3 Final     Lymphocytes, Absolute   Date Value Ref Range Status   03/08/2021 2.66 0.70 - 3.10 10*3/mm3 Final     Monocytes, Absolute   Date Value Ref Range Status   03/08/2021 0.58 0.10 - 0.90 10*3/mm3 Final     Eosinophils, Absolute   Date Value Ref Range Status   03/08/2021 0.10 0.00 - 0.40 10*3/mm3 Final     Basophils, Absolute    Date Value Ref Range Status   03/08/2021 0.03 0.00 - 0.20 10*3/mm3 Final     Immature Grans, Absolute   Date Value Ref Range Status   03/08/2021 0.01 0.00 - 0.05 10*3/mm3 Final     nRBC   Date Value Ref Range Status   03/08/2021 0.0 0.0 - 0.2 /100 WBC Final     Glucose   Date Value Ref Range Status   08/18/2020 91 65 - 99 mg/dL Final     Sodium   Date Value Ref Range Status   08/18/2020 140 136 - 145 mmol/L Final     Potassium   Date Value Ref Range Status   08/18/2020 4.0 3.5 - 5.2 mmol/L Final     CO2   Date Value Ref Range Status   08/18/2020 25.2 22.0 - 29.0 mmol/L Final     Chloride   Date Value Ref Range Status   08/18/2020 103 98 - 107 mmol/L Final     Anion Gap   Date Value Ref Range Status   08/18/2020 11.8 5.0 - 15.0 mmol/L Final     Creatinine   Date Value Ref Range Status   08/18/2020 0.75 0.57 - 1.00 mg/dL Final     BUN   Date Value Ref Range Status   08/18/2020 9 6 - 20 mg/dL Final     BUN/Creatinine Ratio   Date Value Ref Range Status   08/18/2020 12.0 7.0 - 25.0 Final     Calcium   Date Value Ref Range Status   08/18/2020 9.2 8.6 - 10.5 mg/dL Final     Alkaline Phosphatase   Date Value Ref Range Status   08/18/2020 70 39 - 117 U/L Final     Total Protein   Date Value Ref Range Status   08/18/2020 7.7 6.0 - 8.5 g/dL Final     ALT (SGPT)   Date Value Ref Range Status   08/18/2020 14 1 - 33 U/L Final     AST (SGOT)   Date Value Ref Range Status   08/18/2020 15 1 - 32 U/L Final     Total Bilirubin   Date Value Ref Range Status   08/18/2020 0.2 0.0 - 1.2 mg/dL Final     Albumin   Date Value Ref Range Status   08/18/2020 4.30 3.50 - 5.20 g/dL Final     Globulin   Date Value Ref Range Status   08/18/2020 3.4 gm/dL Final     Lab Results   Component Value Date    IRON 33 (L) 03/08/2021    TIBC 440 03/08/2021    LABIRON 8 (L) 03/08/2021    FERRITIN 49.40 03/08/2021    CSAOMWWO40 586 03/08/2021    FOLATE 8.97 03/08/2021     No results found for: , LABCA2, AFPTM, HCGQUANT, , CHROMGRNA, [id number],  CEA, REFLABREPO]      Component      Latest Ref Rng & Units 10/7/2020 1/26/2021   Iron      37 - 145 mcg/dL 46 53   Iron Saturation      20 - 50 % 9 (L) 13 (L)   Transferrin      200 - 360 mg/dL 348 284   TIBC      298 - 536 mcg/dL 519 423             Radiology Data :  No radiology results for the last 7 days    Pathology :  * Cannot find OR log *    ASSESSMENT AND PLAN:      1.  Microcytic anemia:  -Due to iron deficiency anemia from menstrual blood loss plus prior pregnancies  -Patient states she has been taking iron for last few months.  She feels better before her menstrual bleeding and after that she also feels exhausted.  -Her iron studies checked on January 6, 2021 shows iron deficiency but iron level is improved as compared to October 2020  -It was explained to patient because of her ongoing menstrual bleeding, she has persistent iron deficiency.  Recommend continue with iron supplementation for now  -Patient complains of intermittent stomach upset from ferrous sulfate by mouth.  -She is currently taking ferrous sulfate 1 tablet p.o. daily  -Anemia work-up done today on March 8, 2021 shows hemoglobin is 10.4.  Iron studies are showing persistent iron deficiency with iron saturation of 8% and ferritin of 49.  B12 and folate is also intermediate.  -Recommend starting ferrous sulfate 1 tablet twice daily along with B12 and folic acid daily.  Prescription for ferrous sulfate, B12, folic acid has been sent to her pharmacy today.  -We will ask patient to return to clinic in 3 months with repeat CBC and iron studies with ferritin, B12 and folate to be done prior to that  -Patient was instructed to notify our office if she starts having stomach upset from ferrous sulfate, in that case we will start patient on intravenous Feraheme.    2.  Anxiety    3.  Health maintenance: Patient does not smoke.  Had a Pap smear in last 2 weeks        PHQ-9 Total Score: 0   -Patient is not homicidal or suicidal.  No acute  intervention required.      Osiel Camargo reports a pain score of 0.  Given her pain assessment as noted, treatment options were discussed and the following options were decided upon as a follow-up plan to address the patient's pain: No acute intervention required.             Thank you for this consultation.    Alex Vail MD  3/9/2021  06:46 CST        Part of this note may be an electronic transcription/translation of spoken language to printed text using the Dragon Dictation System.

## 2021-03-09 ENCOUNTER — TELEPHONE (OUTPATIENT)
Dept: ONCOLOGY | Facility: HOSPITAL | Age: 26
End: 2021-03-09

## 2021-03-09 LAB
FOLATE SERPL-MCNC: 8.97 NG/ML (ref 4.78–24.2)
VIT B12 BLD-MCNC: 586 PG/ML (ref 211–946)

## 2021-03-09 RX ORDER — FERROUS SULFATE 325(65) MG
325 TABLET ORAL 2 TIMES DAILY WITH MEALS
Qty: 60 TABLET | Refills: 3 | Status: SHIPPED | OUTPATIENT
Start: 2021-03-09 | End: 2021-06-10

## 2021-03-09 RX ORDER — FOLIC ACID 1 MG/1
1 TABLET ORAL DAILY
Qty: 90 TABLET | Refills: 1 | Status: SHIPPED | OUTPATIENT
Start: 2021-03-09 | End: 2021-06-10 | Stop reason: SDUPTHER

## 2021-03-09 RX ORDER — LANOLIN ALCOHOL/MO/W.PET/CERES
1000 CREAM (GRAM) TOPICAL DAILY
Qty: 90 TABLET | Refills: 1 | Status: SHIPPED | OUTPATIENT
Start: 2021-03-09 | End: 2021-06-10 | Stop reason: SDUPTHER

## 2021-03-09 RX ORDER — DOCUSATE SODIUM 100 MG/1
100 CAPSULE, LIQUID FILLED ORAL 2 TIMES DAILY PRN
Qty: 60 CAPSULE | Refills: 2 | Status: SHIPPED | OUTPATIENT
Start: 2021-03-09 | End: 2021-06-10

## 2021-03-09 NOTE — TELEPHONE ENCOUNTER
----- Message from Alex Vail MD sent at 3/9/2021  6:58 AM CST -----  Please let patient know, her iron level is still low in blood.  She needs to start taking ferrous sulfate 1 tablet twice daily.  Her B12 and folate level is also intermediate.  She needs to start taking B12 and folic acid daily.  I have sent prescription for ferrous sulfate, Colace, B12 and folic acid to her pharmacy.  Thank you

## 2021-04-16 ENCOUNTER — OFFICE VISIT (OUTPATIENT)
Dept: FAMILY MEDICINE CLINIC | Facility: CLINIC | Age: 26
End: 2021-04-16

## 2021-04-16 VITALS
TEMPERATURE: 98 F | BODY MASS INDEX: 43.82 KG/M2 | DIASTOLIC BLOOD PRESSURE: 74 MMHG | RESPIRATION RATE: 22 BRPM | WEIGHT: 279.2 LBS | OXYGEN SATURATION: 99 % | HEIGHT: 67 IN | HEART RATE: 71 BPM | SYSTOLIC BLOOD PRESSURE: 130 MMHG

## 2021-04-16 DIAGNOSIS — S76.211A INGUINAL STRAIN, RIGHT, INITIAL ENCOUNTER: Primary | ICD-10-CM

## 2021-04-16 PROBLEM — S76.219A GROIN STRAIN: Status: ACTIVE | Noted: 2021-04-16

## 2021-04-16 PROCEDURE — 99213 OFFICE O/P EST LOW 20 MIN: CPT | Performed by: NURSE PRACTITIONER

## 2021-04-16 RX ORDER — BUPROPION HYDROCHLORIDE 150 MG/1
TABLET ORAL
COMMUNITY
End: 2021-06-10

## 2021-04-16 RX ORDER — TIZANIDINE 2 MG/1
2 TABLET ORAL NIGHTLY PRN
Qty: 14 TABLET | Refills: 0 | Status: SHIPPED | OUTPATIENT
Start: 2021-04-16 | End: 2021-06-10

## 2021-04-16 RX ORDER — IBUPROFEN 800 MG/1
800 TABLET ORAL EVERY 8 HOURS PRN
Qty: 60 TABLET | Refills: 0 | Status: SHIPPED | OUTPATIENT
Start: 2021-04-16 | End: 2021-06-10

## 2021-04-16 RX ORDER — CHOLECALCIFEROL (VITAMIN D3) 10(400)/ML
DROPS ORAL
COMMUNITY
End: 2021-06-10

## 2021-04-16 NOTE — PATIENT INSTRUCTIONS
Muscle Strain  A muscle strain is an injury that happens when a muscle is stretched longer than normal. This can happen during a fall, sports, or lifting. This can tear some muscle fibers. Usually, recovery from muscle strain takes 1-2 weeks. Complete healing normally takes 5-6 weeks.  This condition is first treated with PRICE therapy. This involves:  · Protecting your muscle from being injured again.  · Resting your injured muscle.  · Icing your injured muscle.  · Applying pressure (compression) to your injured muscle. This may be done with a splint or elastic bandage.  · Raising (elevating) your injured muscle.  Your doctor may also recommend medicine for pain.  Follow these instructions at home:  If you have a splint:  · Wear the splint as told by your doctor. Take it off only as told by your doctor.  · Loosen the splint if your fingers or toes tingle, get numb, or turn cold and blue.  · Keep the splint clean.  · If the splint is not waterproof:  ? Do not let it get wet.  ? Cover it with a watertight covering when you take a bath or a shower.  Managing pain, stiffness, and swelling    · If told, put ice on your injured area.  ? If you have a removable splint, take it off as told by your doctor.  ? Put ice in a plastic bag.  ? Place a towel between your skin and the bag.  ? Leave the ice on for 20 minutes, 2-3 times a day.  · Move your fingers or toes often. This helps to avoid stiffness and lessen swelling.  · Raise your injured area above the level of your heart while you are sitting or lying down.  · Wear an elastic bandage as told by your doctor. Make sure it is not too tight.  General instructions  · Take over-the-counter and prescription medicines only as told by your doctor. This may include medicines for pain and swelling that are taken by mouth or put on the skin, prescription pain medicine, or muscle relaxants.  · Limit your activity. Rest your injured muscle as told by your doctor. Your doctor may say  that gentle movements are okay.  · If physical therapy was prescribed, do exercises as told by your doctor.  · Do not put pressure on any part of the splint until it is fully hardened. This may take many hours.  · Do not use any products that contain nicotine or tobacco, such as cigarettes and e-cigarettes. These can delay bone healing. If you need help quitting, ask your doctor.  · Warm up before you exercise. This helps to prevent more muscle strains.  · Ask your doctor when it is safe to drive if you have a splint.  · Keep all follow-up visits as told by your doctor. This is important.  Contact a doctor if:  · You have more pain or swelling in your injured area.  Get help right away if:  · You have any of these problems in your injured area:  ? You have numbness.  ? You have tingling.  ? You lose a lot of strength.  Summary  · A muscle strain is an injury that happens when a muscle is stretched longer than normal.  · This condition is first treated with PRICE therapy. This includes protecting, resting, icing, adding pressure, and raising your injury.  · Limit your activity. Rest your injured muscle as told by your doctor. Your doctor may say that gentle movements are okay.  · Warm up before you exercise. This helps to prevent more muscle strains.  This information is not intended to replace advice given to you by your health care provider. Make sure you discuss any questions you have with your health care provider.  Document Revised: 10/15/2020 Document Reviewed: 01/24/2018  [employer] Patient Education © 2021 [employer]

## 2021-04-16 NOTE — PROGRESS NOTES
"Chief Complaint  Leg Pain (Right leg muscle)    Subjective          Osiel Camargo presents to [facility]    [facility]    PCP: Mark (retired)    CC: \"leg pain\"    Leg Pain   The incident occurred 5 to 7 days ago. Incident location: while on vacation, in hotel, slipped in shower. The injury mechanism was a fall (slipped, legs went into split position--did not make contact/impact with any surfaces). Pain location: right lower abdomen/groin. The quality of the pain is described as aching. The pain is at a severity of 9/10. The pain has been fluctuating since onset. Associated symptoms include an inability to bear weight (painful with walking). Pertinent negatives include no loss of motion, loss of sensation, muscle weakness, numbness or tingling. She reports no foreign bodies present. The symptoms are aggravated by movement and palpation. She has tried nothing for the symptoms. The treatment provided no relief.       Review of Systems   Constitutional: Negative.    HENT: Negative.    Eyes: Negative.    Respiratory: Negative.    Cardiovascular: Negative.    Gastrointestinal: Negative.    Genitourinary: Negative for difficulty urinating.   Musculoskeletal:        +groin pain     Skin: Negative.    Neurological: Negative for tingling, numbness and headaches.        Objective   Vital Signs:   /74 (BP Location: Left arm, Patient Position: Sitting, Cuff Size: Large Adult)   Pulse 71   Temp 98 °F (36.7 °C)   Resp 22   Ht 170.2 cm (67\")   Wt 127 kg (279 lb 3.2 oz)   SpO2 99%   BMI 43.73 kg/m²       Physical Exam  Vitals and nursing note reviewed.   Constitutional:       General: She is not in acute distress.     Appearance: Normal appearance. She is obese. She is not ill-appearing.   HENT:      Head: Normocephalic and atraumatic.   Cardiovascular:      Rate and Rhythm: Normal rate and regular rhythm.   Pulmonary:      Effort: Pulmonary effort is normal. No " respiratory distress.      Breath sounds: Normal breath sounds. No wheezing, rhonchi or rales.   Abdominal:      General: Bowel sounds are normal.      Palpations: Abdomen is soft.      Tenderness: There is abdominal tenderness (right lower abdomen/groin area).   Musculoskeletal:      Cervical back: Neck supple.      Right upper leg: Tenderness ( right groin) present.        Legs:    Skin:     General: Skin is warm and dry.      Findings: No bruising, erythema or rash.   Neurological:      General: No focal deficit present.      Mental Status: She is alert and oriented to person, place, and time.          Result Review :                 Assessment and Plan    Diagnoses and all orders for this visit:    1. Inguinal strain, right, initial encounter (Primary)  -     ibuprofen (ADVIL,MOTRIN) 800 MG tablet; Take 1 tablet by mouth Every 8 (Eight) Hours As Needed (groin pain).  Dispense: 60 tablet; Refill: 0  -     tiZANidine (ZANAFLEX) 2 MG tablet; Take 1 tablet by mouth At Night As Needed for Muscle Spasms.  Dispense: 14 tablet; Refill: 0      Will defer xrays for now.   Rx for Motrin 800, Zanaflex at night x 2 weeks  Alternate heat/ice to area  Gentle stretching    Patient's Body mass index is 43.73 kg/m². BMI is above normal parameters. Recommendations include: referral to primary care.    See PCP or RTC if symptoms persist/worsen  See PCP for routine f/u visit and management of chronic medical conditions      This document has been electronically signed by CAMPBELL Hernadez on April 16, 2021 12:19 CDT,.

## 2021-04-19 ENCOUNTER — OFFICE VISIT (OUTPATIENT)
Dept: FAMILY MEDICINE CLINIC | Facility: CLINIC | Age: 26
End: 2021-04-19

## 2021-04-19 VITALS
DIASTOLIC BLOOD PRESSURE: 78 MMHG | SYSTOLIC BLOOD PRESSURE: 134 MMHG | WEIGHT: 275.2 LBS | BODY MASS INDEX: 43.19 KG/M2 | TEMPERATURE: 98.2 F | HEART RATE: 73 BPM | OXYGEN SATURATION: 99 % | HEIGHT: 67 IN | RESPIRATION RATE: 24 BRPM
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
## 2021-04-19 DIAGNOSIS — S16.1XXA ACUTE STRAIN OF NECK MUSCLE, INITIAL ENCOUNTER: Primary | ICD-10-CM
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
## 2021-04-19 PROCEDURE — 99214 OFFICE O/P EST MOD 30 MIN: CPT | Performed by: NURSE PRACTITIONER

## 2021-04-19 PROCEDURE — 96372 THER/PROPH/DIAG INJ SC/IM: CPT | Performed by: NURSE PRACTITIONER

## 2021-04-19 RX ORDER — KETOROLAC TROMETHAMINE 10 MG/1
10 TABLET, FILM COATED ORAL EVERY 6 HOURS PRN
Qty: 20 TABLET | Refills: 0 | Status: SHIPPED | OUTPATIENT
Start: 2021-04-19 | End: 2021-06-10

## 2021-04-19 RX ORDER — KETOROLAC TROMETHAMINE 30 MG/ML
60 INJECTION, SOLUTION INTRAMUSCULAR; INTRAVENOUS ONCE
Status: COMPLETED | OUTPATIENT
Start: 2021-04-19 | End: 2021-04-19

## 2021-04-19 RX ADMIN — KETOROLAC TROMETHAMINE 60 MG: 30 INJECTION, SOLUTION INTRAMUSCULAR; INTRAVENOUS at 17:17

## 2021-04-19 NOTE — PATIENT INSTRUCTIONS
Cervical Sprain  A cervical sprain is also called a neck sprain. It is a stretch or tear in one or more ligaments in the neck. Ligaments are tissues that connect bones to each other.  Neck sprains can be mild, bad, or very bad. A very bad sprain in the neck can cause the bones in the neck to be unstable. This can damage the spinal cord. It can also cause serious problems in the brain, spinal cord, and nerves (nervous system).  Most neck sprains heal in 4-6 weeks. It can take more or less time depending on:  · What caused the injury.  · The amount of injury.  What are the causes?  Neck sprains may be caused by trauma, such as:  · An injury from an accident in a vehicle such as a car or boat.  · A fall.  · The head and neck being moved front to back or side to side all of a sudden (whiplash injury).  Mild neck sprains may be caused by wear and tear over time.  What increases the risk?  The following factors may make you more likely to develop this condition:  · Taking part in activities that put you at high risk of hurting your neck. These include:  ? Contact sports.  ? Car racing.  ? Gymnastics.  ? Diving.  · Taking risks when driving or riding in a vehicle such as a car or boat.  · Arthritis caused by wear and tear of the joints in the spine.  · The neck not being very strong or flexible.  · Having had a neck injury in the past.  · Poor posture.  · Spending a lot of time in certain positions that put stress on the neck. This may be from sitting at a computer for a long time.  What are the signs or symptoms?  Symptoms of this condition include:  · Your neck, shoulders, or upper back feeling:  ? Painful or sore.  ? Stiff.  ? Tender.  ? Swollen.  ? Hot, or like it is burning.  · Sudden tightening of neck muscles (spasms).  · Not being able to move the neck very much.  · Headache.  · Feeling dizzy.  · Feeling like you may vomit, or vomiting.  · Having a hand or arm that:  ? Feels weak.  ? Loses feeling (feels  numb).  ? Tingles.  You may get symptoms right away after injury, or you may get them over a few days. In some cases, symptoms may go away with treatment and come back over time.  How is this treated?  This condition is treated by:  · Resting your neck.  · Icing the part of your neck that is hurt.  · Doing exercises to restore movement and strength to your neck (physical therapy).  If there is no swelling, you may use heat therapy 2-3 days after the injury took place. If your injury is very bad, treatment may also include:  · Keeping your neck in place for a length of time. This may be done using:  ? A neck collar. This supports your chin and the back of your head.  ? A cervical traction device. This is a sling that holds up your head. The sling removes weight and pressure from your neck. It may also help to relieve pain.  · Medicines that help with:  ? Pain.  ? Irritation and swelling (inflammation).  · Medicines that help to relax your muscles (muscle relaxants).  · Surgery. This is rare.  Follow these instructions at home:  Medicines    · Take over-the-counter and prescription medicines only as told by your doctor.  · Ask your doctor if the medicine prescribed to you:  ? Requires you to avoid driving or using heavy machinery.  ? Can cause trouble pooping (constipation). You may need to take these actions to prevent or treat trouble pooping:  § Drink enough fluid to keep your pee (urine) pale yellow.  § Take over-the-counter or prescription medicines.  § Eat foods that are high in fiber. These include beans, whole grains, and fresh fruits and vegetables.  § Limit foods that are high in fat and sugar. These include fried or sweet foods.  If you have a neck collar:  · Wear it as told by your doctor. Do not take it off unless told.  · Ask your doctor before adjusting your collar.  · If you have long hair, keep it outside of the collar.  · Ask your doctor if you may take off the collar for cleaning and bathing. If you  may take off the collar:  ? Follow instructions about how to take it off safely.  ? Clean it by hand with mild soap and water. Let it air-dry fully.  ? If your collar has pads that you can take out:  § Take the pads out every 1-2 days.  § Wash them by hand with soap and water.  § Let the pads air-dry fully before you put them back in the collar.  ? Tell your doctor if your skin under the collar has irritation or sores.  Managing pain, stiffness, and swelling         · Use a cervical traction device, if told by your doctor.  · If told, put ice on the affected area. To do this:  ? Put ice in a plastic bag.  ? Place a towel between your skin and the bag.  ? Leave the ice on for 20 minutes, 2-3 times a day.  · If told, put heat on the affected area. Do this before exercise or as often as told by your doctor. Use the heat source that your doctor recommends, such as a moist heat pack or a heating pad.  ? Place a towel between your skin and the heat source.  ? Leave the heat on for 20-30 minutes.  ? Take the heat off if your skin turns bright red. This is very important if you cannot feel pain, heat, or cold. You may have a greater risk of getting burned.  Activity  · Do not drive while wearing a neck collar. If you do not have a neck collar, ask if it is safe to drive while your neck heals.  · Do not lift anything that is heavier than 10 lb (4.5 kg), or the limit that you are told, until your doctor tells you that it is safe.  · Rest as told by your doctor.  · Do exercises as told by your doctor or physical therapist.  · Return to your normal activities as told by your doctor. Avoid positions and activities that make you feel worse. Ask your doctor what activities are safe for you.  General instructions  · Do not use any products that contain nicotine or tobacco, such as cigarettes, e-cigarettes, and chewing tobacco. These can delay healing. If you need help quitting, ask your doctor.  · Keep all follow-up visits as told  by your doctor or physical therapist. This is important.  How is this prevented?  To prevent a neck sprain from happening again:  · Practice good posture. Adjust your workstation to help you do this.  · Exercise regularly as told by your doctor or physical therapist.  · Avoid activities that are risky or may cause a neck sprain.  Contact a doctor if:  · Your symptoms get worse.  · Your symptoms do not get better after 2 weeks of treatment.  · Your pain gets worse.  · Medicine does not help your pain.  · You have new symptoms that you cannot explain.  · Your neck collar gives you sores on your skin or bothers your skin.  Get help right away if:  · You have very bad pain.  · You get any of the following in any part of your body:  ? Loss of feeling.  ? Tingling.  ? Weakness.  · You cannot move a part of your body.  · You have neck pain and either of these:  ? Very bad dizziness.  ? A very bad headache.  Summary  · A cervical sprain is also called a neck sprain. It is a stretch or tear in one or more ligaments in the neck. Ligaments are tissues that connect bones.  · Neck sprains may be caused by trauma, such as an injury or a fall.  · You may get symptoms right away after injury, or you may get them over a few days.  · Neck sprains may be treated with rest, heat, ice, medicines, exercise, and surgery.  This information is not intended to replace advice given to you by your health care provider. Make sure you discuss any questions you have with your health care provider.  Document Revised: 08/26/2020 Document Reviewed: 08/26/2020  Elsevier Patient Education © 2021 [employer]
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
## 2021-06-09 ENCOUNTER — TELEPHONE (OUTPATIENT)
Dept: FAMILY MEDICINE CLINIC | Facility: CLINIC | Age: 26
End: 2021-06-09

## 2021-06-10 ENCOUNTER — OFFICE VISIT (OUTPATIENT)
Dept: FAMILY MEDICINE CLINIC | Facility: CLINIC | Age: 26
End: 2021-06-10

## 2021-06-10 VITALS
HEART RATE: 62 BPM | TEMPERATURE: 96.6 F | DIASTOLIC BLOOD PRESSURE: 60 MMHG | OXYGEN SATURATION: 99 % | BODY MASS INDEX: 44.2 KG/M2 | WEIGHT: 275 LBS | HEIGHT: 66 IN | SYSTOLIC BLOOD PRESSURE: 110 MMHG

## 2021-06-10 DIAGNOSIS — E55.9 VITAMIN D DEFICIENCY: Chronic | ICD-10-CM

## 2021-06-10 DIAGNOSIS — Z13.6 ENCOUNTER FOR SCREENING FOR CARDIOVASCULAR DISORDERS: ICD-10-CM
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
## 2021-06-10 DIAGNOSIS — E66.01 MORBID (SEVERE) OBESITY DUE TO EXCESS CALORIES (HCC): ICD-10-CM

## 2021-06-10 DIAGNOSIS — R53.83 OTHER FATIGUE: ICD-10-CM

## 2021-06-10 DIAGNOSIS — Z13.29 ENCOUNTER FOR SCREENING FOR ENDOCRINE DISORDER: ICD-10-CM

## 2021-06-10 DIAGNOSIS — Z11.59 NEED FOR HEPATITIS C SCREENING TEST: ICD-10-CM

## 2021-06-10 DIAGNOSIS — M54.89 MIDLINE BACK PAIN, UNSPECIFIED BACK LOCATION, UNSPECIFIED CHRONICITY: ICD-10-CM

## 2021-06-10 DIAGNOSIS — Z13.1 ENCOUNTER FOR SCREENING FOR DIABETES MELLITUS: ICD-10-CM

## 2021-06-10 DIAGNOSIS — E53.8 VITAMIN B12 DEFICIENCY: ICD-10-CM

## 2021-06-10 DIAGNOSIS — N92.0 MENORRHAGIA WITH REGULAR CYCLE: ICD-10-CM

## 2021-06-10 DIAGNOSIS — D50.9 IRON DEFICIENCY ANEMIA, UNSPECIFIED IRON DEFICIENCY ANEMIA TYPE: Chronic | ICD-10-CM

## 2021-06-10 PROCEDURE — 99214 OFFICE O/P EST MOD 30 MIN: CPT | Performed by: FAMILY MEDICINE

## 2021-06-10 RX ORDER — FERROUS SULFATE 324(65)MG
324 TABLET, DELAYED RELEASE (ENTERIC COATED) ORAL
Qty: 30 TABLET | Refills: 0 | Status: SHIPPED | OUTPATIENT
Start: 2021-06-10 | End: 2022-05-19 | Stop reason: SDUPTHER

## 2021-06-10 RX ORDER — MELOXICAM 15 MG/1
15 TABLET ORAL DAILY
Qty: 30 TABLET | Refills: 3 | Status: SHIPPED | OUTPATIENT
Start: 2021-06-10 | End: 2022-12-19

## 2021-06-10 RX ORDER — LANOLIN ALCOHOL/MO/W.PET/CERES
1000 CREAM (GRAM) TOPICAL DAILY
Qty: 90 TABLET | Refills: 1 | Status: SHIPPED | OUTPATIENT
Start: 2021-06-10 | End: 2021-12-06

## 2021-06-10 RX ORDER — ERGOCALCIFEROL 1.25 MG/1
50000 CAPSULE ORAL WEEKLY
Qty: 5 CAPSULE | Refills: 3 | Status: SHIPPED | OUTPATIENT
Start: 2021-06-10 | End: 2022-05-19 | Stop reason: SDUPTHER

## 2021-06-10 RX ORDER — FOLIC ACID 1 MG/1
1 TABLET ORAL DAILY
Qty: 90 TABLET | Refills: 1 | Status: SHIPPED | OUTPATIENT
Start: 2021-06-10 | End: 2021-12-06

## 2021-06-10 NOTE — PATIENT INSTRUCTIONS
Dr. MUNOZ on [employer] intemittent fasting     16 to 8 ratio    Stop eating at 8 pm and start eating at noon. Feeding period noon to 8 pm    Limit less 1500 calories    Drink a lot of water with fasting      No breast no pasta, no carb no diary    Focus on vegetables lean protein     mobic for back pain Meloxicam tablets  What is this medicine?  MELOXICAM (cruz OX i cam) is a non-steroidal anti-inflammatory drug (NSAID). It is used to reduce swelling and to treat pain. It may be used for osteoarthritis, rheumatoid arthritis, or juvenile rheumatoid arthritis.  This medicine may be used for other purposes; ask your health care provider or pharmacist if you have questions.  COMMON BRAND NAME(S): Mobic  What should I tell my health care provider before I take this medicine?  They need to know if you have any of these conditions:  · bleeding disorders  · cigarette smoker  · coronary artery bypass graft (CABG) surgery within the past 2 weeks  · drink more than 3 alcohol-containing drinks per day  · heart disease  · high blood pressure  · history of stomach bleeding  · kidney disease  · liver disease  · lung or breathing disease, like asthma  · stomach or intestine problems  · an unusual or allergic reaction to meloxicam, aspirin, other NSAIDs, other medicines, foods, dyes, or preservatives  · pregnant or trying to get pregnant  · breast-feeding  How should I use this medicine?  Take this medicine by mouth with a full glass of water. Follow the directions on the prescription label. You can take it with or without food. If it upsets your stomach, take it with food. Take your medicine at regular intervals. Do not take it more often than directed. Do not stop taking except on your doctor's advice.  A special MedGuide will be given to you by the pharmacist with each prescription and refill. Be sure to read this information carefully each time.  Talk to your pediatrician regarding the use of this medicine in children. While this  drug may be prescribed for selected conditions, precautions do apply.  Patients over 65 years old may have a stronger reaction and need a smaller dose.  Overdosage: If you think you have taken too much of this medicine contact a poison control center or emergency room at once.  NOTE: This medicine is only for you. Do not share this medicine with others.  What if I miss a dose?  If you miss a dose, take it as soon as you can. If it is almost time for your next dose, take only that dose. Do not take double or extra doses.  What may interact with this medicine?  Do not take this medicine with any of the following medications:  · cidofovir  · ketorolac  This medicine may also interact with the following medications:  · aspirin and aspirin-like medicines  · certain medicines for blood pressure, heart disease, irregular heart beat  · certain medicines for depression, anxiety, or psychotic disturbances  · certain medicines that treat or prevent blood clots like warfarin, enoxaparin, dalteparin, apixaban, dabigatran, rivaroxaban  · cyclosporine  · diuretics  · fluconazole  · lithium  · methotrexate  · other NSAIDs, medicines for pain and inflammation, like ibuprofen and naproxen  · pemetrexed  This list may not describe all possible interactions. Give your health care provider a list of all the medicines, herbs, non-prescription drugs, or dietary supplements you use. Also tell them if you smoke, drink alcohol, or use illegal drugs. Some items may interact with your medicine.  What should I watch for while using this medicine?  Tell your doctor or healthcare provider if your symptoms do not start to get better or if they get worse.  This medicine may cause serious skin reactions. They can happen weeks to months after starting the medicine. Contact your healthcare provider right away if you notice fevers or flu-like symptoms with a rash. The rash may be red or purple and then turn into blisters or peeling of the skin. Or, you  might notice a red rash with swelling of the face, lips or lymph nodes in your neck or under your arms.  Do not take other medicines that contain aspirin, ibuprofen, or naproxen with this medicine. Side effects such as stomach upset, nausea, or ulcers may be more likely to occur. Many medicines available without a prescription should not be taken with this medicine.  This medicine can cause ulcers and bleeding in the stomach and intestines at any time during treatment. This can happen with no warning and may cause death. There is increased risk with taking this medicine for a long time. Smoking, drinking alcohol, older age, and poor health can also increase risks. Call your doctor right away if you have stomach pain or blood in your vomit or stool.  This medicine does not prevent heart attack or stroke. In fact, this medicine may increase the chance of a heart attack or stroke. The chance may increase with longer use of this medicine and in people who have heart disease. If you take aspirin to prevent heart attack or stroke, talk with your doctor or healthcare provider.  What side effects may I notice from receiving this medicine?  Side effects that you should report to your doctor or health care professional as soon as possible:  · allergic reactions like skin rash, itching or hives, swelling of the face, lips, or tongue  · nausea, vomiting  · redness, blistering, peeling, or loosening of the skin, including inside the mouth  · signs and symptoms of a blood clot such as breathing problems; changes in vision; chest pain; severe, sudden headache; pain, swelling, warmth in the leg; trouble speaking; sudden numbness or weakness of the face, arm, or leg  · signs and symptoms of bleeding such as bloody or black, tarry stools; red or dark-brown urine; spitting up blood or brown material that looks like coffee grounds; red spots on the skin; unusual bruising or bleeding from the eye, gums, or nose  · signs and symptoms of  liver injury like dark yellow or brown urine; general ill feeling or flu-like symptoms; light-colored stools; loss of appetite; nausea; right upper belly pain; unusually weak or tired; yellowing of the eyes or skin  · signs and symptoms of stroke like changes in vision; confusion; trouble speaking or understanding; severe headaches; sudden numbness or weakness of the face, arm, or leg; trouble walking; dizziness; loss of balance or coordination  Side effects that usually do not require medical attention (report to your doctor or health care professional if they continue or are bothersome):  · constipation  · diarrhea  · gas  This list may not describe all possible side effects. Call your doctor for medical advice about side effects. You may report side effects to FDA at 6-007-PLD-1308.  Where should I keep my medicine?  Keep out of the reach of children.  Store at room temperature between 15 and 30 degrees C (59 and 86 degrees F). Throw away any unused medicine after the expiration date.  NOTE: This sheet is a summary. It may not cover all possible information. If you have questions about this medicine, talk to your doctor, pharmacist, or health care provider.  © 2021 [employer] (2020-03-18 11:21:28)

## 2021-06-11 ENCOUNTER — TELEPHONE (OUTPATIENT)
Dept: FAMILY MEDICINE CLINIC | Facility: CLINIC | Age: 26
End: 2021-06-11

## 2021-06-11 DIAGNOSIS — M62.838 MUSCLE SPASM: ICD-10-CM

## 2021-06-11 DIAGNOSIS — M41.9 SCOLIOSIS, UNSPECIFIED SCOLIOSIS TYPE, UNSPECIFIED SPINAL REGION: ICD-10-CM

## 2021-06-11 DIAGNOSIS — Z87.828 HISTORY OF MOTOR VEHICLE ACCIDENT: Primary | ICD-10-CM

## 2021-06-11 DIAGNOSIS — M54.89 MIDLINE BACK PAIN, UNSPECIFIED BACK LOCATION, UNSPECIFIED CHRONICITY: ICD-10-CM

## 2021-06-11 RX ORDER — CYCLOBENZAPRINE HCL 10 MG
10 TABLET ORAL
Qty: 30 TABLET | Refills: 3 | Status: SHIPPED | OUTPATIENT
Start: 2021-06-11 | End: 2022-04-13

## 2021-06-11 NOTE — TELEPHONE ENCOUNTER
----- Message from Adarsh Turner MD sent at 6/10/2021 10:50 AM CDT -----  Please let pt know that thoracic x-ray shows no thoracic fracture has minimal levoscoliosis or curvature of back.  May be causing her pain. Recommend PT/OT. If pt agreeable let me know and I  will order    Consider MRI in future    Recheck on next visit thanks

## 2021-06-11 NOTE — TELEPHONE ENCOUNTER
----- Message from Adarsh Turner MD sent at 6/10/2021 10:49 AM CDT -----  Please let pt know that x-ray of lumbar spine showed no lumbar fracture    Has straightening of lumbar lordosis to suggest muscle spasm. If pt interested start on flexeril 10 mg at bedtime only give 30 pills and 3 refills. Do not take while driving    If pt interested in PT/OT let me know and I will order.     Recheck on next visit thanks

## 2021-06-22 ENCOUNTER — TREATMENT (OUTPATIENT)
Dept: PHYSICAL THERAPY | Facility: CLINIC | Age: 26
End: 2021-06-22

## 2021-06-22 DIAGNOSIS — M54.89 MIDLINE BACK PAIN, UNSPECIFIED BACK LOCATION, UNSPECIFIED CHRONICITY: ICD-10-CM

## 2021-06-22 DIAGNOSIS — S16.1XXA ACUTE STRAIN OF NECK MUSCLE, INITIAL ENCOUNTER: Primary | ICD-10-CM
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
## 2021-06-22 DIAGNOSIS — E66.01 MORBID (SEVERE) OBESITY DUE TO EXCESS CALORIES (HCC): ICD-10-CM

## 2021-06-22 PROCEDURE — 97162 PT EVAL MOD COMPLEX 30 MIN: CPT | Performed by: PHYSICAL THERAPIST
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
## 2021-06-24 ENCOUNTER — TREATMENT (OUTPATIENT)
Dept: PHYSICAL THERAPY | Facility: CLINIC | Age: 26
End: 2021-06-24
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
## 2021-06-24 DIAGNOSIS — S16.1XXA ACUTE STRAIN OF NECK MUSCLE, INITIAL ENCOUNTER: Primary | ICD-10-CM

## 2021-06-24 DIAGNOSIS — E66.01 MORBID (SEVERE) OBESITY DUE TO EXCESS CALORIES (HCC): ICD-10-CM

## 2021-06-24 DIAGNOSIS — M54.89 MIDLINE BACK PAIN, UNSPECIFIED BACK LOCATION, UNSPECIFIED CHRONICITY: ICD-10-CM

## 2021-06-24 PROCEDURE — 97110 THERAPEUTIC EXERCISES: CPT | Performed by: PHYSICAL THERAPIST

## 2021-06-30 ENCOUNTER — TREATMENT (OUTPATIENT)
Dept: PHYSICAL THERAPY | Facility: CLINIC | Age: 26
End: 2021-06-30
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
## 2021-06-30 DIAGNOSIS — M54.89 MIDLINE BACK PAIN, UNSPECIFIED BACK LOCATION, UNSPECIFIED CHRONICITY: ICD-10-CM

## 2021-06-30 DIAGNOSIS — E66.01 MORBID (SEVERE) OBESITY DUE TO EXCESS CALORIES (HCC): ICD-10-CM

## 2021-06-30 DIAGNOSIS — S16.1XXA ACUTE STRAIN OF NECK MUSCLE, INITIAL ENCOUNTER: Primary | ICD-10-CM

## 2021-06-30 PROCEDURE — 97110 THERAPEUTIC EXERCISES: CPT | Performed by: PHYSICAL THERAPIST
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
## 2021-07-02 ENCOUNTER — TELEPHONE (OUTPATIENT)
Dept: PHYSICAL THERAPY | Facility: CLINIC | Age: 26
End: 2021-07-02

## 2021-07-19 ENCOUNTER — TELEPHONE (OUTPATIENT)
Dept: PHYSICAL THERAPY | Facility: CLINIC | Age: 26
End: 2021-07-19

## 2021-07-21 ENCOUNTER — TELEPHONE (OUTPATIENT)
Dept: FAMILY MEDICINE CLINIC | Facility: CLINIC | Age: 26
End: 2021-07-21

## 2021-09-13 ENCOUNTER — OFFICE VISIT (OUTPATIENT)
Dept: FAMILY MEDICINE CLINIC | Facility: CLINIC | Age: 26
End: 2021-09-13

## 2021-09-13 VITALS
HEIGHT: 66 IN | TEMPERATURE: 99.1 F | WEIGHT: 273.13 LBS | SYSTOLIC BLOOD PRESSURE: 122 MMHG | RESPIRATION RATE: 20 BRPM | DIASTOLIC BLOOD PRESSURE: 68 MMHG | HEART RATE: 65 BPM | BODY MASS INDEX: 43.9 KG/M2 | OXYGEN SATURATION: 100 %

## 2021-09-13 DIAGNOSIS — K08.89 PAIN, DENTAL: Primary | ICD-10-CM

## 2021-09-13 PROCEDURE — 99213 OFFICE O/P EST LOW 20 MIN: CPT | Performed by: NURSE PRACTITIONER

## 2021-09-13 RX ORDER — IBUPROFEN 800 MG/1
800 TABLET ORAL EVERY 8 HOURS PRN
Qty: 30 TABLET | Refills: 0 | Status: SHIPPED | OUTPATIENT
Start: 2021-09-13 | End: 2022-04-13

## 2021-09-13 RX ORDER — FLUCONAZOLE 150 MG/1
TABLET ORAL
Qty: 2 TABLET | Refills: 0 | Status: SHIPPED | OUTPATIENT
Start: 2021-09-13 | End: 2022-01-12

## 2021-09-13 RX ORDER — CLINDAMYCIN HYDROCHLORIDE 300 MG/1
300 CAPSULE ORAL 4 TIMES DAILY
Qty: 40 CAPSULE | Refills: 0 | Status: SHIPPED | OUTPATIENT
Start: 2021-09-13 | End: 2021-09-23

## 2021-09-13 NOTE — PROGRESS NOTES
"Chief Complaint  tooth pain (top/back both sides) x 1 week    Subjective          Osiel Camargo presents to [facility]    PCP: Dr. Turner    CC: \"dental pain\"    Has had issues with same tooth on/off for the last year.  Has not followed up with the dentist at this point due to having a hard time finding someone that takes her insurance.  Last treated about 8 months ago.  Started hurting again about a week ago.  Denies fever, swelling.  Does do peroxide rinses at times.  Reports PCN, Amoxil has not been beneficial and seems to have done better with Cleocin.     Dental Pain   This is a recurrent problem. The current episode started more than 1 year ago (most recently in the last week). The problem occurs constantly. The problem has been gradually worsening. The pain is at a severity of 10/10. Associated symptoms include thermal sensitivity. Pertinent negatives include no difficulty swallowing, facial pain, fever, oral bleeding or sinus pressure. She has tried NSAIDs (peroxide rinses) for the symptoms. The treatment provided no relief.       Review of Systems   Constitutional: Negative.  Negative for fever.   HENT: Positive for dental problem. Negative for ear discharge, ear pain, postnasal drip, rhinorrhea, sinus pressure, sinus pain, sore throat and trouble swallowing.    Eyes: Negative.    Respiratory: Negative.    Cardiovascular: Negative.    Gastrointestinal: Negative.    Genitourinary: Negative.    Musculoskeletal: Negative.    Skin: Negative.    Neurological: Positive for headaches (at times). Negative for dizziness.        Objective   Vital Signs:   /68 (BP Location: Right arm, Patient Position: Sitting, Cuff Size: Adult)   Pulse 65   Temp 99.1 °F (37.3 °C) (Temporal)   Resp 20   Ht 167.6 cm (66\")   Wt 124 kg (273 lb 2 oz)   SpO2 100%   BMI 44.08 kg/m²       Physical Exam  Vitals and nursing note reviewed. "   Constitutional:       General: She is not in acute distress.     Appearance: Normal appearance. She is not ill-appearing.   HENT:      Head: Normocephalic and atraumatic.      Right Ear: Tympanic membrane and ear canal normal. Tympanic membrane is not erythematous.      Left Ear: Tympanic membrane and ear canal normal. Tympanic membrane is not erythematous.      Mouth/Throat:     Cardiovascular:      Rate and Rhythm: Normal rate and regular rhythm.   Pulmonary:      Effort: Pulmonary effort is normal. No respiratory distress.      Breath sounds: Normal breath sounds. No wheezing, rhonchi or rales.   Musculoskeletal:      Cervical back: Neck supple. No tenderness.   Lymphadenopathy:      Cervical: No cervical adenopathy.   Skin:     General: Skin is warm and dry.   Neurological:      General: No focal deficit present.      Mental Status: She is alert and oriented to person, place, and time.   Psychiatric:         Mood and Affect: Mood normal.         Thought Content: Thought content normal.          Result Review :                 Assessment and Plan    Diagnoses and all orders for this visit:    1. Pain, dental (Primary)  -     ibuprofen (ADVIL,MOTRIN) 800 MG tablet; Take 1 tablet by mouth Every 8 (Eight) Hours As Needed (dental pain).  Dispense: 30 tablet; Refill: 0  -     clindamycin (CLEOCIN) 300 MG capsule; Take 1 capsule by mouth 4 (Four) Times a Day for 10 days.  Dispense: 40 capsule; Refill: 0    Other orders  -     fluconazole (Diflucan) 150 MG tablet; 1 tab po x 1 now, may repeat in 4 days prn yeast  Dispense: 2 tablet; Refill: 0        Rx for Motrin 800, Clindamycin, Diflucan PRN provided  Continue with peroxide rinses at least BID  Strongly encouraged to schedule dental f/u for evaluation/treatment    See PCP or RTC if symptoms persist/worsen  See PCP for routine f/u visit and management of chronic medical conditions      This document has been electronically signed by CAMPBELL Hernadez on September  13, 2021 16:43 ALFONSOT,.  .

## 2021-12-06 RX ORDER — FOLIC ACID 1 MG/1
TABLET ORAL
Qty: 90 TABLET | Refills: 0 | Status: SHIPPED | OUTPATIENT
Start: 2021-12-06 | End: 2022-05-19 | Stop reason: SDUPTHER

## 2021-12-06 RX ORDER — DM/PE/ACETAMINOPHEN/CHLORPHENR 10-5-325-2
TABLET, SEQUENTIAL ORAL
Qty: 90 EACH | Refills: 0 | Status: SHIPPED | OUTPATIENT
Start: 2021-12-06 | End: 2022-05-19 | Stop reason: SDUPTHER

## 2022-01-12 ENCOUNTER — OFFICE VISIT (OUTPATIENT)
Dept: FAMILY MEDICINE CLINIC | Facility: CLINIC | Age: 27
End: 2022-01-12

## 2022-01-12 DIAGNOSIS — N89.8 VAGINAL ODOR: ICD-10-CM

## 2022-01-12 DIAGNOSIS — D50.9 IRON DEFICIENCY ANEMIA, UNSPECIFIED IRON DEFICIENCY ANEMIA TYPE: Chronic | ICD-10-CM

## 2022-01-12 DIAGNOSIS — N76.0 ACUTE VAGINITIS: Primary | ICD-10-CM

## 2022-01-12 DIAGNOSIS — E55.9 VITAMIN D DEFICIENCY: Chronic | ICD-10-CM

## 2022-01-12 PROCEDURE — 99441 PR PHYS/QHP TELEPHONE EVALUATION 5-10 MIN: CPT | Performed by: NURSE PRACTITIONER

## 2022-01-12 RX ORDER — FERROUS SULFATE 324(65)MG
324 TABLET, DELAYED RELEASE (ENTERIC COATED) ORAL
Qty: 30 TABLET | Refills: 0 | Status: CANCELLED | OUTPATIENT
Start: 2022-01-12

## 2022-01-12 RX ORDER — FOLIC ACID 1 MG/1
1000 TABLET ORAL DAILY
Qty: 90 TABLET | Refills: 0 | Status: CANCELLED | OUTPATIENT
Start: 2022-01-12

## 2022-01-12 RX ORDER — METRONIDAZOLE 500 MG/1
500 TABLET ORAL 2 TIMES DAILY
Qty: 14 TABLET | Refills: 0 | Status: SHIPPED | OUTPATIENT
Start: 2022-01-12 | End: 2022-04-13

## 2022-01-12 RX ORDER — FLUCONAZOLE 150 MG/1
TABLET ORAL
Qty: 2 TABLET | Refills: 0 | Status: SHIPPED | OUTPATIENT
Start: 2022-01-12 | End: 2022-01-24

## 2022-01-12 RX ORDER — ERGOCALCIFEROL 1.25 MG/1
50000 CAPSULE ORAL WEEKLY
Qty: 5 CAPSULE | Refills: 3 | Status: CANCELLED | OUTPATIENT
Start: 2022-01-12

## 2022-01-12 RX ORDER — DM/PE/ACETAMINOPHEN/CHLORPHENR 10-5-325-2
1 TABLET, SEQUENTIAL ORAL DAILY
Qty: 90 EACH | Refills: 0 | Status: CANCELLED | OUTPATIENT
Start: 2022-01-12

## 2022-01-12 NOTE — TELEPHONE ENCOUNTER
Pt is calling stating that she has a yeast infection but is positive for Covid, Is there something you can call in for her as well as her vitamins

## 2022-01-12 NOTE — PROGRESS NOTES
"Chief Complaint  Vaginitis    Subjective          Osiel Camargo presents to [facility] - [place]    You have chosen to receive care through a telephone visit. Do you consent to use a telephone visit for your medical care today? Yes    PCP: Dr. Turner    CC: \"possible yeast infection\"      Has Covid, still in quarantine.  No STD concern.  No recent steroid or antibiotic use. C/O white to yellow vaginal discharge with slight odor.  Started after using a scented body scrub.     Vaginitis  This is a new problem. Episode onset: x 3-4 days. The problem occurs daily. The problem has been unchanged. Pertinent negatives include no abdominal pain, anorexia, arthralgias, change in bowel habit, chest pain, chills, congestion, coughing, diaphoresis, fatigue, fever, headaches, joint swelling, myalgias, nausea, neck pain, numbness, rash, sore throat, swollen glands, urinary symptoms, vertigo, visual change, vomiting or weakness. Associated symptoms comments: Covid symptoms improved--still has a few more days of quarantine remaining  . Nothing aggravates the symptoms. She has tried nothing for the symptoms. The treatment provided no relief.       Review of Systems   Constitutional: Negative.  Negative for chills, diaphoresis, fatigue and fever.   HENT: Negative for congestion and sore throat.    Respiratory: Negative.  Negative for cough.    Cardiovascular: Negative.  Negative for chest pain.   Gastrointestinal: Negative.  Negative for abdominal pain, anorexia, change in bowel habit, nausea and vomiting.   Genitourinary: Positive for vaginal discharge (with odor, minimal itching). Negative for dysuria, flank pain, frequency and menstrual problem.   Musculoskeletal: Negative for arthralgias, joint swelling, myalgias and neck pain.   Skin: Negative.  Negative for rash.   Neurological: Negative for dizziness, vertigo, weakness, numbness and headaches.        Objective   Vital Signs:   There " were no vitals taken for this visit.      Physical Exam  Constitutional:       General: She is not in acute distress.     Appearance: She is not ill-appearing.   Neurological:      Mental Status: She is alert and oriented to person, place, and time.   Psychiatric:         Mood and Affect: Mood normal.         Thought Content: Thought content normal.          Result Review :                 Assessment and Plan    Diagnoses and all orders for this visit:    1. Acute vaginitis (Primary)  -     metroNIDAZOLE (Flagyl) 500 MG tablet; Take 1 tablet by mouth 2 (Two) Times a Day.  Dispense: 14 tablet; Refill: 0  -     fluconazole (Diflucan) 150 MG tablet; 1 tab po x 1 now, may repeat in 7 days prn yeast  Dispense: 2 tablet; Refill: 0    2. Vaginal odor  -     metroNIDAZOLE (Flagyl) 500 MG tablet; Take 1 tablet by mouth 2 (Two) Times a Day.  Dispense: 14 tablet; Refill: 0  -     fluconazole (Diflucan) 150 MG tablet; 1 tab po x 1 now, may repeat in 7 days prn yeast  Dispense: 2 tablet; Refill: 0      Treatment for suspected BV/possible yeast--Rx for Flagyl--avoid alcohol, Rx for Diflucan  If symptoms persist, she has been asked to call and schedule in office appointment for cultures, she is agreeable    See PCP or RTC if symptoms persist/worsen  See PCP for routine f/u visit and management of chronic medical conditions      This document has been electronically signed by CAMPBELL Hernadez on January 12, 2022 16:56 CST,.    This visit has been rescheduled as a phone visit to comply with patient safety concerns in accordance with CDC recommendations. Total time of discussion was 8 minutes.

## 2022-01-12 NOTE — PATIENT INSTRUCTIONS
Vaginitis    Vaginitis is irritation and swelling of the vagina. Treatment will depend on the cause.  What are the causes?  It can be caused by:  · Bacteria.  · Yeast.  · A parasite.  · A virus.  · Low hormone levels.  · Bubble baths, scented tampons, and feminine sprays.  Other things can change the balance of the yeast and bacteria that live in the vagina. These include:  · Antibiotic medicines.  · Not being clean enough.  · Some birth control methods.  · Sex.  · Infection.  · Diabetes.  · A weakened body defense system (immune system).  What increases the risk?  · Smoking or being around someone who smokes.  · Using washes (douches), scented tampons, or scented pads.  · Wearing tight pants or thong underwear.  · Using birth control pills or an IUD.  · Having sex without a condom or having a lot of partners.  · Having an STI.  · Using a certain product to kill sperm (nonoxynol-9).  · Eating foods that are high in sugar.  · Having diabetes.  · Having low levels of a female hormone.  · Having a weakened body defense system.  · Being pregnant or breastfeeding.  What are the signs or symptoms?  · Fluid coming from the vagina that is not normal.  · A bad smell.  · Itching, pain, or swelling.  · Pain with sex.  · Pain or burning when you pee (urinate).  Sometimes there are no symptoms.  How is this treated?  Treatment may include:  · Antibiotic creams or pills.  · Antifungal medicines.  · Medicines to ease symptoms if you have a virus. Your sex partner should also be treated.  · Estrogen medicines.  · Avoiding scented soaps, sprays, or douches.  · Stopping use of products that caused irritation and then using a cream to treat symptoms.  Follow these instructions at home:  Lifestyle  · Keep the area around your vagina clean and dry.  ? Avoid using soap.  ? Rinse the area with water.  · Until your doctor says it is okay:  ? Do not use washes for the vagina.  ? Do not use tampons.  ? Do not have sex.  · Wipe from front to  back after going to the bathroom.  · When your doctor says it is okay, practice safe sex and use condoms.  General instructions  · Take over-the-counter and prescription medicines only as told by your doctor.  · If you were prescribed an antibiotic medicine, take or use it as told by your doctor. Do not stop taking or using it even if you start to feel better.  · Keep all follow-up visits.  How is this prevented?  · Do not use things that can irritate the vagina, such as fabric softeners. Avoid these products if they are scented:  ? Sprays.  ? Detergents.  ? Tampons.  ? Products for cleaning the vagina.  ? Soaps or bubble baths.  · Let air reach your vagina. To do this:  ? Wear cotton underwear.  ? Do not wear:  § Underwear while you sleep.  § Tight pants.  § Thong underwear.  § Underwear or nylons without a cotton panel.  ? Take off any wet clothing, such as bathing suits, as soon as you can.  ? Practice safe sex and use condoms.  Contact a doctor if:  · You have pain in your belly or in the area between your hips.  · You have a fever or chills.  · Your symptoms last for more than 2-3 days.  Get help right away if:  · You have a fever and your symptoms get worse all of a sudden.  Summary  · Vaginitis is irritation and swelling of the vagina.  · Treatment will depend on the cause of the condition.  · Do not use washes or tampons or have sex until your doctor says it is okay.  This information is not intended to replace advice given to you by your health care provider. Make sure you discuss any questions you have with your health care provider.  Document Revised: 06/17/2021 Document Reviewed: 06/17/2021  Elsevier Patient Education © 2021 [employer]

## 2022-01-24 ENCOUNTER — OFFICE VISIT (OUTPATIENT)
Dept: FAMILY MEDICINE CLINIC | Facility: CLINIC | Age: 27
End: 2022-01-24

## 2022-01-24 VITALS
BODY MASS INDEX: 43.55 KG/M2 | OXYGEN SATURATION: 98 % | DIASTOLIC BLOOD PRESSURE: 80 MMHG | TEMPERATURE: 98 F | HEIGHT: 66 IN | HEART RATE: 85 BPM | SYSTOLIC BLOOD PRESSURE: 124 MMHG | WEIGHT: 271 LBS

## 2022-01-24 DIAGNOSIS — J02.9 SORE THROAT: ICD-10-CM

## 2022-01-24 DIAGNOSIS — E66.01 MORBID (SEVERE) OBESITY DUE TO EXCESS CALORIES: Chronic | ICD-10-CM

## 2022-01-24 DIAGNOSIS — B37.0 THRUSH: Primary | ICD-10-CM

## 2022-01-24 LAB
EXPIRATION DATE: NORMAL
INTERNAL CONTROL: NORMAL
Lab: NORMAL
S PYO AG THROAT QL: NEGATIVE

## 2022-01-24 PROCEDURE — 99213 OFFICE O/P EST LOW 20 MIN: CPT | Performed by: NURSE PRACTITIONER

## 2022-01-24 PROCEDURE — 87081 CULTURE SCREEN ONLY: CPT | Performed by: NURSE PRACTITIONER

## 2022-01-24 PROCEDURE — 87880 STREP A ASSAY W/OPTIC: CPT | Performed by: NURSE PRACTITIONER

## 2022-01-24 RX ORDER — FLUCONAZOLE 100 MG/1
TABLET ORAL
Qty: 3 TABLET | Refills: 0 | Status: SHIPPED | OUTPATIENT
Start: 2022-01-24 | End: 2022-04-13

## 2022-01-24 NOTE — PROGRESS NOTES
"Chief Complaint  Sore Throat (3 days)    Subjective          Osiel Camargo presents to [facility] - [facility]    PCP: Dr. Turner    CC: \"sore throat; weight loss surgery\"    Patient presents with sore throat, but also requesting referral for possible bariatric surgery.  Morbidly obese, has tried diet and exercise with little success.   Weight has stayed about the same for the last 6-7 months.  BMI 43-44.    Sore Throat   This is a new problem. Episode onset: x 3 days. The problem has been unchanged. There has been no fever. The pain is at a severity of 7/10. Associated symptoms include congestion ( at times), coughing (occasional to clear throat) and headaches (no change from normal). Pertinent negatives include no abdominal pain, diarrhea, drooling, ear discharge, ear pain, hoarse voice, plugged ear sensation, neck pain, shortness of breath, stridor, swollen glands, trouble swallowing or vomiting. Associated symptoms comments: +coating on tongue. She has had no exposure to strep or mono. Exposure to: denies Covid exposures. Treatments tried: otc cold meds. The treatment provided no relief.       Review of Systems   Constitutional: Negative.    HENT: Positive for congestion ( at times) and sore throat. Negative for drooling, ear discharge, ear pain, hoarse voice, postnasal drip, rhinorrhea, sinus pressure, sinus pain, sneezing and trouble swallowing.         +coating on tongue     Eyes: Negative.    Respiratory: Positive for cough (occasional to clear throat). Negative for chest tightness, shortness of breath and stridor.    Cardiovascular: Negative.    Gastrointestinal: Negative.  Negative for abdominal pain, diarrhea and vomiting.   Genitourinary: Negative.    Musculoskeletal: Negative.  Negative for neck pain.   Skin: Negative.    Neurological: Positive for headaches (no change from normal). Negative for dizziness.        Objective   Vital " "Signs:   /80   Pulse 85   Temp 98 °F (36.7 °C)   Ht 167.6 cm (66\")   Wt 123 kg (271 lb)   SpO2 98%   BMI 43.74 kg/m²       Physical Exam  Vitals and nursing note reviewed.   Constitutional:       General: She is not in acute distress.     Appearance: Normal appearance. She is obese. She is not ill-appearing.   HENT:      Head: Normocephalic and atraumatic.      Right Ear: Tympanic membrane and ear canal normal.      Left Ear: Tympanic membrane and ear canal normal.      Nose: Nose normal. No congestion or rhinorrhea.      Mouth/Throat:      Mouth: Mucous membranes are moist.      Pharynx: Oropharynx is clear. No oropharyngeal exudate or posterior oropharyngeal erythema.      Comments: Thick yellow to white coating on tongue    Eyes:      General:         Right eye: No discharge.         Left eye: No discharge.      Conjunctiva/sclera: Conjunctivae normal.   Cardiovascular:      Rate and Rhythm: Normal rate and regular rhythm.   Pulmonary:      Effort: Pulmonary effort is normal. No respiratory distress.      Breath sounds: Normal breath sounds. No wheezing, rhonchi or rales.   Musculoskeletal:      Cervical back: Neck supple. No tenderness.   Lymphadenopathy:      Cervical: No cervical adenopathy.   Skin:     General: Skin is warm and dry.   Neurological:      General: No focal deficit present.      Mental Status: She is alert and oriented to person, place, and time.   Psychiatric:         Mood and Affect: Mood normal.         Thought Content: Thought content normal.          Result Review :     Common labs    Common Labsle 3/8/21   WBC 7.25   Hemoglobin 10.4 (A)   Hematocrit 32.6 (A)   Platelets 304   (A) Abnormal value                       Recent Results (from the past 24 hour(s))   POC Rapid Strep A    Collection Time: 01/24/22  9:39 AM    Specimen: Swab   Result Value Ref Range    Rapid Strep A Screen Negative Negative, VALID, INVALID, Not Performed    Internal Control Passed Passed    Lot Number " [id number]     Expiration Date 06-        Assessment and Plan    Diagnoses and all orders for this visit:    1. Thrush (Primary)  -     nystatin (MYCOSTATIN) 100,000 unit/mL suspension; Swish and swallow 5 mL 4 (Four) Times a Day.  Dispense: 480 mL; Refill: 1  -     fluconazole (Diflucan) 100 MG tablet; 1 tab every 3 days x 3 doses  Dispense: 3 tablet; Refill: 0    2. Sore throat  -     POC Rapid Strep A  -     Beta Strep Culture, Throat - , Throat    3. Morbid (severe) obesity due to excess calories (HCC)      Rx for Diflucan, Nystatin provided  Strep culture pending.  Declines Covid testing at this time.     Referral to Bariatric surgery for consultation placed--will f/u with PCP once back in office if additional visits related to surgery are needed    See PCP or RTC if symptoms persist/worsen  See PCP for routine f/u visit and management of chronic medical conditions      This document has been electronically signed by CAMPBELL Hernadez on January 24, 2022 09:51 CST,.

## 2022-01-24 NOTE — PATIENT INSTRUCTIONS
Oral Thrush, Adult  Oral thrush, also called oral candidiasis, is a fungal infection that develops in the mouth and throat and on the tongue. It causes white patches to form in the mouth and on the tongue.  Many cases of thrush are mild, but this infection can also be serious. Thrush can be a repeated (recurrent) problem for certain people who have a weak body defense system (immune system). The weakness can be caused by chronic illnesses, or by taking medicines that limit the body's ability to fight infection. If a person has difficulty fighting infection, the fungus that causes thrush can spread through the body. This can cause life-threatening blood or organ infections.  What are the causes?  This condition is caused by a fungus (yeast) called Candida albicans.  · This fungus is normally present in small amounts in the mouth and on other mucous membranes. It usually causes no harm.  · If conditions are present that allow the fungus to grow without control, it invades surrounding tissues and becomes an infection.  · Other Candida species can also lead to thrush, though this is rare.  What increases the risk?  The following factors may make you more likely to develop this condition:  · Having a weakened immune system.  · Being an older adult.  · Having diabetes, cancer, or HIV (human immunodeficiency virus).  · Having dry mouth (xerostomia).  · Being pregnant or breastfeeding.  · Having poor dental care, especially in those who have dentures.  · Using antibiotic or steroid medicines.  What are the signs or symptoms?  Symptoms of this condition can vary from mild and moderate to severe and persistent. Symptoms may include:  · A burning feeling in the mouth and throat. This can occur at the start of a thrush infection.  · White patches that stick to the mouth and tongue. The tissue around the patches may be red, raw, and painful. If rubbed (during tooth brushing, for example), the patches and the tissue of the mouth  may bleed easily.  · A bad taste in the mouth or difficulty tasting foods.  · A cottony feeling in the mouth.  · Pain during eating and swallowing.  · Poor appetite.  · Cracking at the corners of the mouth.  How is this diagnosed?  This condition is diagnosed based on:  · A physical exam.  · Your medical history.  How is this treated?  This condition is treated with medicines called antifungals, which prevent the growth of fungi. These medicines are either applied directly to the affected area (topical) or swallowed (oral). The treatment will depend on the severity of the condition.  · Mild cases of thrush may be treated with an antifungal mouth rinse or lozenges. Treatment usually lasts about 14 days.  · Moderate to severe cases of thrush can be treated with oral antifungal medicine, if they have spread to the esophagus. A topical antifungal medicine may also be used. For some severe infections, treatment may need to continue for more than 14 days.  ? Oral antifungal medicines are rarely used during pregnancy because they may be harmful to the unborn child. If you are pregnant, talk with your health care provider about options for treatment.  · Persistent or recurrent thrush. For cases of thrush that do not go away or keep coming back:  ? Treatment may be needed twice as long as the symptoms last.  ? Treatment will include both oral and topical antifungal medicines.  ? People with a weakened immune system can take an antifungal medicine on a continuous basis to prevent thrush infections.  It is important to treat conditions that make a person more likely to get thrush, such as diabetes or HIV.  Follow these instructions at home:  Medicines  · Take or use over-the-counter and prescription medicines only as told by your health care provider.  · Talk with your health care provider about an over-the-counter medicine called gentian violet, which kills bacteria and fungi.  Relieving soreness and discomfort  To help  reduce the discomfort of thrush:  · Drink cold liquids such as water or iced tea.  · Try flavored ice treats or frozen juices.  · Eat foods that are easy to swallow, such as gelatin, ice cream, or custard.  · Try drinking from a straw if the patches in your mouth are painful.    General instructions  · Eat plain, unflavored yogurt as directed by your health care provider. Check the label to make sure the yogurt contains live cultures. This yogurt can help healthy bacteria grow in the mouth and can stop the growth of the fungus that causes thrush.  · If you wear dentures, remove the dentures before going to bed, brush them vigorously, and soak them in a cleaning solution as directed by your health care provider.  · Rinse your mouth with a warm salt-water mixture several times a day. To make a salt-water mixture, dissolve ½-1 tsp (3-6 g) of salt in 1 cup (237 mL) of warm water.  Contact a health care provider if:  · Your symptoms are getting worse or are not improving within 7 days of starting treatment.  · You have symptoms of a spreading infection, such as white patches on the skin outside of the mouth.  · You are breastfeeding your baby and you have redness and pain in the nipples.  Summary  · Oral thrush, also called oral candidiasis, is a fungal infection that develops in the mouth and throat and on the tongue. It causes white patches to form in the mouth and on the tongue.  · You are more likely to get this condition if you have a weakened immune system or an underlying condition, such as HIV, cancer, or diabetes.  · This condition is treated with medicines called antifungals, which prevent the growth of fungi.  · Contact a health care provider if your symptoms do not improve, or get worse, within 7 days of starting treatment.  This information is not intended to replace advice given to you by your health care provider. Make sure you discuss any questions you have with your health care provider.  Document  Revised: 10/23/2020 Document Reviewed: 10/23/2020  Elsevier Patient Education © 2021 [employer]

## 2022-01-26 ENCOUNTER — TELEPHONE (OUTPATIENT)
Dept: FAMILY MEDICINE CLINIC | Facility: CLINIC | Age: 27
End: 2022-01-26

## 2022-01-26 LAB — BACTERIA SPEC AEROBE CULT: NORMAL

## 2022-01-26 NOTE — TELEPHONE ENCOUNTER
Grupo Santiago V, APRN   1/26/2022  9:02 AM CST         Strep culture negative.      Made pt aware and she voiced understanding.

## 2022-04-13 ENCOUNTER — OFFICE VISIT (OUTPATIENT)
Dept: FAMILY MEDICINE CLINIC | Facility: CLINIC | Age: 27
End: 2022-04-13

## 2022-04-13 VITALS
BODY MASS INDEX: 43.71 KG/M2 | DIASTOLIC BLOOD PRESSURE: 60 MMHG | WEIGHT: 272 LBS | SYSTOLIC BLOOD PRESSURE: 120 MMHG | TEMPERATURE: 98 F | OXYGEN SATURATION: 98 % | HEIGHT: 66 IN | HEART RATE: 64 BPM

## 2022-04-13 DIAGNOSIS — K04.7 DENTAL ABSCESS: Primary | ICD-10-CM

## 2022-04-13 PROCEDURE — 99213 OFFICE O/P EST LOW 20 MIN: CPT | Performed by: NURSE PRACTITIONER

## 2022-04-13 RX ORDER — CLINDAMYCIN HYDROCHLORIDE 300 MG/1
CAPSULE ORAL
COMMUNITY
Start: 2022-04-10 | End: 2022-05-19

## 2022-04-13 RX ORDER — FLUCONAZOLE 150 MG/1
TABLET ORAL
Qty: 2 TABLET | Refills: 0 | Status: SHIPPED | OUTPATIENT
Start: 2022-04-13 | End: 2022-05-19

## 2022-04-13 RX ORDER — CHLORHEXIDINE GLUCONATE 0.12 MG/ML
15 RINSE ORAL 2 TIMES DAILY
Qty: 480 ML | Refills: 1 | Status: SHIPPED | OUTPATIENT
Start: 2022-04-13 | End: 2022-07-13

## 2022-04-13 NOTE — PROGRESS NOTES
"Chief Complaint  Abscess (Tooth)    Subjective          Osiel Camargo presents to [facility]IMARY [facility] - [facility]    PCP: Dr. Turner    CC: \"abscessed tooth\"      Reports she had veneers placed in [place] in March.  Was told she had 6 additional teeth that would need to be extracted, has appt in June.  Developed dental pain right, upper back last week with swelling. Was in [place], reports she was seen at an ER, given Clindamycin.  Area is now draining.  Pain has improved, no fever.  Was not given Diflucan.  Concerned about the drainage from the abscess.        Abscess  This is a new problem. The current episode started in the past 7 days. The problem occurs daily. Progression since onset: now draining for a couple of days. Pertinent negatives include no abdominal pain, anorexia, arthralgias, change in bowel habit, chest pain, chills, congestion, coughing, diaphoresis, fatigue, fever, headaches, joint swelling, myalgias, nausea, neck pain, numbness, rash, sore throat, swollen glands, urinary symptoms, vertigo, visual change, vomiting or weakness. Nothing aggravates the symptoms. Treatments tried: started Clinamycin on 4-. Improvement on treatment: pain is gone, area now draining.       Review of Systems   Constitutional: Negative for appetite change, chills, diaphoresis, fatigue and fever.   HENT: Positive for dental problem and facial swelling (at onset, improved). Negative for congestion, ear pain, postnasal drip, rhinorrhea, sinus pressure, sinus pain, sneezing, sore throat and trouble swallowing.    Eyes: Negative.    Respiratory: Negative.  Negative for cough.    Cardiovascular: Negative.  Negative for chest pain.   Gastrointestinal: Negative.  Negative for abdominal pain, anorexia, change in bowel habit, nausea and vomiting.   Genitourinary: Negative.    Musculoskeletal: Negative for arthralgias, joint swelling, myalgias and neck " "pain.   Skin: Negative.  Negative for rash.   Neurological: Negative for dizziness, vertigo, weakness, numbness and headaches.        Objective   Vital Signs:   /60 (BP Location: Right arm, Patient Position: Sitting)   Pulse 64   Temp 98 °F (36.7 °C)   Ht 167.6 cm (66\")   Wt 123 kg (272 lb)   SpO2 98%   BMI 43.90 kg/m²       Physical Exam  Vitals and nursing note reviewed.   Constitutional:       General: She is not in acute distress.     Appearance: Normal appearance. She is obese. She is not ill-appearing.   HENT:      Head: Normocephalic and atraumatic.      Mouth/Throat:      Dentition: Dental tenderness, dental caries, dental abscesses and gum lesions present.     Cardiovascular:      Rate and Rhythm: Normal rate and regular rhythm.   Pulmonary:      Effort: Pulmonary effort is normal. No respiratory distress.      Breath sounds: Normal breath sounds. No wheezing, rhonchi or rales.   Musculoskeletal:      Cervical back: Neck supple. No tenderness.   Lymphadenopathy:      Cervical: No cervical adenopathy.   Skin:     General: Skin is warm and dry.   Neurological:      General: No focal deficit present.      Mental Status: She is alert and oriented to person, place, and time.   Psychiatric:         Mood and Affect: Mood normal.         Thought Content: Thought content normal.          Result Review :                 Assessment and Plan    Diagnoses and all orders for this visit:    1. Dental abscess (Primary)  -     chlorhexidine (Peridex) 0.12 % solution; Apply 15 mL to the mouth or throat 2 (Two) Times a Day.  Dispense: 480 mL; Refill: 1    Other orders  -     fluconazole (Diflucan) 150 MG tablet; 1 tab po x 1 now, may repeat in 4 days prn yeast  Dispense: 2 tablet; Refill: 0      Finish antibiotics to completion.  Rx for Diflucan if needed.   Rx for Peridex rinse provided.   Keep appt with dentist/oral surgeon as scheduled in June, f/u sooner if problems.     See PCP for routine f/u visit and " management of chronic medical conditions      This document has been electronically signed by CAMPBELL Hernadez on April 13, 2022 09:08 CDT,.
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
## 2022-05-19 ENCOUNTER — OFFICE VISIT (OUTPATIENT)
Dept: FAMILY MEDICINE CLINIC | Facility: CLINIC | Age: 27
End: 2022-05-19

## 2022-05-19 ENCOUNTER — TELEPHONE (OUTPATIENT)
Dept: FAMILY MEDICINE CLINIC | Facility: CLINIC | Age: 27
End: 2022-05-19

## 2022-05-19 VITALS
OXYGEN SATURATION: 99 % | HEIGHT: 66 IN | SYSTOLIC BLOOD PRESSURE: 132 MMHG | BODY MASS INDEX: 44.71 KG/M2 | TEMPERATURE: 98 F | WEIGHT: 278.2 LBS | HEART RATE: 102 BPM | DIASTOLIC BLOOD PRESSURE: 64 MMHG

## 2022-05-19 DIAGNOSIS — Z13.29 ENCOUNTER FOR SCREENING FOR ENDOCRINE DISORDER: ICD-10-CM

## 2022-05-19 DIAGNOSIS — E55.9 VITAMIN D DEFICIENCY: Chronic | ICD-10-CM

## 2022-05-19 DIAGNOSIS — N92.0 MENORRHAGIA WITH REGULAR CYCLE: ICD-10-CM

## 2022-05-19 DIAGNOSIS — K04.7 DENTAL ABSCESS: ICD-10-CM

## 2022-05-19 DIAGNOSIS — Z13.1 ENCOUNTER FOR SCREENING FOR DIABETES MELLITUS: ICD-10-CM

## 2022-05-19 DIAGNOSIS — E66.01 MORBID (SEVERE) OBESITY DUE TO EXCESS CALORIES: ICD-10-CM

## 2022-05-19 DIAGNOSIS — D50.9 IRON DEFICIENCY ANEMIA, UNSPECIFIED IRON DEFICIENCY ANEMIA TYPE: Chronic | ICD-10-CM

## 2022-05-19 DIAGNOSIS — F41.1 GAD (GENERALIZED ANXIETY DISORDER): Primary | ICD-10-CM

## 2022-05-19 DIAGNOSIS — Z00.00 ANNUAL PHYSICAL EXAM: ICD-10-CM

## 2022-05-19 DIAGNOSIS — Z13.6 ENCOUNTER FOR SCREENING FOR CARDIOVASCULAR DISORDERS: ICD-10-CM

## 2022-05-19 PROCEDURE — 99214 OFFICE O/P EST MOD 30 MIN: CPT | Performed by: FAMILY MEDICINE

## 2022-05-19 RX ORDER — DM/PE/ACETAMINOPHEN/CHLORPHENR 10-5-325-2
1 TABLET, SEQUENTIAL ORAL DAILY
Qty: 90 EACH | Refills: 1 | Status: SHIPPED | OUTPATIENT
Start: 2022-05-19 | End: 2022-12-27 | Stop reason: SDUPTHER

## 2022-05-19 RX ORDER — FERROUS SULFATE 324(65)MG
324 TABLET, DELAYED RELEASE (ENTERIC COATED) ORAL
Qty: 30 TABLET | Refills: 3 | Status: SHIPPED | OUTPATIENT
Start: 2022-05-19 | End: 2022-05-24 | Stop reason: SDUPTHER

## 2022-05-19 RX ORDER — FLUCONAZOLE 150 MG/1
150 TABLET ORAL ONCE
Qty: 2 TABLET | Refills: 0 | Status: SHIPPED | OUTPATIENT
Start: 2022-05-19 | End: 2022-05-19

## 2022-05-19 RX ORDER — FOLIC ACID 1 MG/1
1000 TABLET ORAL DAILY
Qty: 90 TABLET | Refills: 1 | Status: SHIPPED | OUTPATIENT
Start: 2022-05-19 | End: 2022-12-27 | Stop reason: SDUPTHER

## 2022-05-19 RX ORDER — AMOXICILLIN 500 MG/1
1000 CAPSULE ORAL 2 TIMES DAILY
Qty: 14 CAPSULE | Refills: 0 | Status: SHIPPED | OUTPATIENT
Start: 2022-05-19 | End: 2022-05-20 | Stop reason: SDUPTHER

## 2022-05-19 RX ORDER — ERGOCALCIFEROL 1.25 MG/1
50000 CAPSULE ORAL WEEKLY
Qty: 5 CAPSULE | Refills: 3 | Status: SHIPPED | OUTPATIENT
Start: 2022-05-19 | End: 2022-12-27 | Stop reason: SDUPTHER

## 2022-05-19 RX ORDER — BUSPIRONE HYDROCHLORIDE 5 MG/1
5 TABLET ORAL 3 TIMES DAILY
Qty: 90 TABLET | Refills: 3 | Status: SHIPPED | OUTPATIENT
Start: 2022-05-19 | End: 2022-12-19

## 2022-05-19 NOTE — PATIENT INSTRUCTIONS
No eating or drinking after midnight except water     Buspar every 8 hours as needed     Cut back on fast foods.     Diet exercise weight loss      More vegetables,

## 2022-05-20 ENCOUNTER — LAB (OUTPATIENT)
Dept: LAB | Facility: HOSPITAL | Age: 27
End: 2022-05-20

## 2022-05-20 DIAGNOSIS — Z13.1 ENCOUNTER FOR SCREENING FOR DIABETES MELLITUS: ICD-10-CM

## 2022-05-20 DIAGNOSIS — E55.9 VITAMIN D DEFICIENCY: ICD-10-CM

## 2022-05-20 DIAGNOSIS — Z11.59 NEED FOR HEPATITIS C SCREENING TEST: ICD-10-CM

## 2022-05-20 DIAGNOSIS — Z13.29 ENCOUNTER FOR SCREENING FOR ENDOCRINE DISORDER: ICD-10-CM

## 2022-05-20 DIAGNOSIS — E53.8 VITAMIN B12 DEFICIENCY: ICD-10-CM

## 2022-05-20 DIAGNOSIS — Z13.6 ENCOUNTER FOR SCREENING FOR CARDIOVASCULAR DISORDERS: ICD-10-CM

## 2022-05-20 DIAGNOSIS — N92.0 MENORRHAGIA WITH REGULAR CYCLE: ICD-10-CM

## 2022-05-20 DIAGNOSIS — R53.83 OTHER FATIGUE: ICD-10-CM

## 2022-05-20 DIAGNOSIS — D50.9 IRON DEFICIENCY ANEMIA, UNSPECIFIED IRON DEFICIENCY ANEMIA TYPE: ICD-10-CM

## 2022-05-20 DIAGNOSIS — E66.01 MORBID (SEVERE) OBESITY DUE TO EXCESS CALORIES: ICD-10-CM

## 2022-05-20 PROCEDURE — 85025 COMPLETE CBC W/AUTO DIFF WBC: CPT

## 2022-05-20 PROCEDURE — 83540 ASSAY OF IRON: CPT

## 2022-05-20 PROCEDURE — 84443 ASSAY THYROID STIM HORMONE: CPT

## 2022-05-20 PROCEDURE — 80061 LIPID PANEL: CPT

## 2022-05-20 PROCEDURE — 83036 HEMOGLOBIN GLYCOSYLATED A1C: CPT

## 2022-05-20 PROCEDURE — 83001 ASSAY OF GONADOTROPIN (FSH): CPT

## 2022-05-20 PROCEDURE — 82728 ASSAY OF FERRITIN: CPT

## 2022-05-20 PROCEDURE — 83002 ASSAY OF GONADOTROPIN (LH): CPT

## 2022-05-20 PROCEDURE — 82306 VITAMIN D 25 HYDROXY: CPT

## 2022-05-20 PROCEDURE — 86803 HEPATITIS C AB TEST: CPT

## 2022-05-20 PROCEDURE — 82607 VITAMIN B-12: CPT

## 2022-05-20 PROCEDURE — 80053 COMPREHEN METABOLIC PANEL: CPT

## 2022-05-20 PROCEDURE — 84146 ASSAY OF PROLACTIN: CPT

## 2022-05-20 PROCEDURE — 84439 ASSAY OF FREE THYROXINE: CPT

## 2022-05-20 PROCEDURE — 84466 ASSAY OF TRANSFERRIN: CPT

## 2022-05-20 RX ORDER — AMOXICILLIN 500 MG/1
500 CAPSULE ORAL 2 TIMES DAILY
Qty: 14 CAPSULE | Refills: 0 | Status: SHIPPED | OUTPATIENT
Start: 2022-05-20 | End: 2022-05-27

## 2022-05-21 LAB
25(OH)D3 SERPL-MCNC: 13.4 NG/ML (ref 30–100)
ALBUMIN SERPL-MCNC: 4 G/DL (ref 3.5–5.2)
ALBUMIN/GLOB SERPL: 1.5 G/DL
ALP SERPL-CCNC: 62 U/L (ref 39–117)
ALT SERPL W P-5'-P-CCNC: 20 U/L (ref 1–33)
ANION GAP SERPL CALCULATED.3IONS-SCNC: 9.4 MMOL/L (ref 5–15)
AST SERPL-CCNC: 22 U/L (ref 1–32)
BASOPHILS # BLD AUTO: 0.03 10*3/MM3 (ref 0–0.2)
BASOPHILS NFR BLD AUTO: 0.7 % (ref 0–1.5)
BILIRUB SERPL-MCNC: 0.3 MG/DL (ref 0–1.2)
BUN SERPL-MCNC: 11 MG/DL (ref 6–20)
BUN/CREAT SERPL: 17.2 (ref 7–25)
CALCIUM SPEC-SCNC: 8.6 MG/DL (ref 8.6–10.5)
CHLORIDE SERPL-SCNC: 103 MMOL/L (ref 98–107)
CHOLEST SERPL-MCNC: 125 MG/DL (ref 0–200)
CO2 SERPL-SCNC: 23.6 MMOL/L (ref 22–29)
CREAT SERPL-MCNC: 0.64 MG/DL (ref 0.57–1)
DEPRECATED RDW RBC AUTO: 46.1 FL (ref 37–54)
EGFRCR SERPLBLD CKD-EPI 2021: 126 ML/MIN/1.73
EOSINOPHIL # BLD AUTO: 0.07 10*3/MM3 (ref 0–0.4)
EOSINOPHIL NFR BLD AUTO: 1.6 % (ref 0.3–6.2)
ERYTHROCYTE [DISTWIDTH] IN BLOOD BY AUTOMATED COUNT: 16 % (ref 12.3–15.4)
FERRITIN SERPL-MCNC: 32.1 NG/ML (ref 13–150)
FSH SERPL-ACNC: 3.74 MIU/ML
GLOBULIN UR ELPH-MCNC: 2.6 GM/DL
GLUCOSE SERPL-MCNC: 86 MG/DL (ref 65–99)
HBA1C MFR BLD: 5.2 % (ref 4.8–5.6)
HCT VFR BLD AUTO: 33.9 % (ref 34–46.6)
HCV AB SER DONR QL: NORMAL
HDLC SERPL-MCNC: 44 MG/DL (ref 40–60)
HGB BLD-MCNC: 10.2 G/DL (ref 12–15.9)
IRON 24H UR-MRATE: 30 MCG/DL (ref 37–145)
IRON SATN MFR SERPL: 7 % (ref 20–50)
LDLC SERPL CALC-MCNC: 72 MG/DL (ref 0–100)
LDLC/HDLC SERPL: 1.7 {RATIO}
LH SERPL-ACNC: 8.27 MIU/ML
LYMPHOCYTES # BLD AUTO: 1.77 10*3/MM3 (ref 0.7–3.1)
LYMPHOCYTES NFR BLD AUTO: 40.7 % (ref 19.6–45.3)
MCH RBC QN AUTO: 23.7 PG (ref 26.6–33)
MCHC RBC AUTO-ENTMCNC: 30.1 G/DL (ref 31.5–35.7)
MCV RBC AUTO: 78.8 FL (ref 79–97)
MONOCYTES # BLD AUTO: 0.43 10*3/MM3 (ref 0.1–0.9)
MONOCYTES NFR BLD AUTO: 9.9 % (ref 5–12)
NEUTROPHILS NFR BLD AUTO: 2.04 10*3/MM3 (ref 1.7–7)
NEUTROPHILS NFR BLD AUTO: 46.9 % (ref 42.7–76)
PLATELET # BLD AUTO: 283 10*3/MM3 (ref 140–450)
PMV BLD AUTO: 13.7 FL (ref 6–12)
POTASSIUM SERPL-SCNC: 4.4 MMOL/L (ref 3.5–5.2)
PROLACTIN SERPL-MCNC: 19 NG/ML (ref 4.79–23.3)
PROT SERPL-MCNC: 6.6 G/DL (ref 6–8.5)
RBC # BLD AUTO: 4.3 10*6/MM3 (ref 3.77–5.28)
SODIUM SERPL-SCNC: 136 MMOL/L (ref 136–145)
T4 FREE SERPL-MCNC: 1.03 NG/DL (ref 0.93–1.7)
TIBC SERPL-MCNC: 428 MCG/DL (ref 298–536)
TRANSFERRIN SERPL-MCNC: 287 MG/DL (ref 200–360)
TRIGL SERPL-MCNC: 31 MG/DL (ref 0–150)
TSH SERPL DL<=0.05 MIU/L-ACNC: 0.53 UIU/ML (ref 0.27–4.2)
VIT B12 BLD-MCNC: 586 PG/ML (ref 211–946)
VLDLC SERPL-MCNC: 9 MG/DL (ref 5–40)
WBC NRBC COR # BLD: 4.35 10*3/MM3 (ref 3.4–10.8)

## 2022-05-24 ENCOUNTER — TELEPHONE (OUTPATIENT)
Dept: FAMILY MEDICINE CLINIC | Facility: CLINIC | Age: 27
End: 2022-05-24

## 2022-05-24 DIAGNOSIS — D50.9 IRON DEFICIENCY ANEMIA, UNSPECIFIED IRON DEFICIENCY ANEMIA TYPE: Chronic | ICD-10-CM

## 2022-05-24 RX ORDER — FERROUS SULFATE 324(65)MG
324 TABLET, DELAYED RELEASE (ENTERIC COATED) ORAL 2 TIMES DAILY
Qty: 60 TABLET | Refills: 3 | Status: SHIPPED | OUTPATIENT
Start: 2022-05-24 | End: 2022-12-27 | Stop reason: SDUPTHER

## 2022-05-24 NOTE — TELEPHONE ENCOUNTER
----- Message from Adarsh Turner MD sent at 5/21/2022 11:19 AM CDT -----  Please call pt    Vitamin D is low and continue on vitamin D pill once a week    Pt continues to be anemic with hemoglobin at 10.2. also iron levels low. Recommend pt continue on iron supplement. If she is taking daily. Have her take twice a day instead. Give ferrous sulfate 325 mg PO BID with 60 pills and 3 refills    Proceed with OB/GYN referral. Likely will need transvaginal US for her menorrhagia/heavy menstrual cycle     Recheck on next visit thanks

## 2022-07-13 ENCOUNTER — OFFICE VISIT (OUTPATIENT)
Dept: FAMILY MEDICINE CLINIC | Facility: CLINIC | Age: 27
End: 2022-07-13

## 2022-07-13 VITALS
BODY MASS INDEX: 45.08 KG/M2 | WEIGHT: 280.5 LBS | TEMPERATURE: 98 F | SYSTOLIC BLOOD PRESSURE: 122 MMHG | HEIGHT: 66 IN | HEART RATE: 90 BPM | OXYGEN SATURATION: 99 % | DIASTOLIC BLOOD PRESSURE: 68 MMHG

## 2022-07-13 DIAGNOSIS — K04.7 DENTAL ABSCESS: Primary | ICD-10-CM

## 2022-07-13 PROCEDURE — 99213 OFFICE O/P EST LOW 20 MIN: CPT | Performed by: NURSE PRACTITIONER

## 2022-07-13 RX ORDER — CHLORHEXIDINE GLUCONATE 0.12 MG/ML
15 RINSE ORAL 2 TIMES DAILY
Qty: 480 ML | Refills: 0 | Status: SHIPPED | OUTPATIENT
Start: 2022-07-13 | End: 2022-12-19 | Stop reason: SDUPTHER

## 2022-07-13 RX ORDER — FLUCONAZOLE 150 MG/1
TABLET ORAL
Qty: 2 TABLET | Refills: 0 | Status: SHIPPED | OUTPATIENT
Start: 2022-07-13 | End: 2022-12-19

## 2022-07-13 RX ORDER — CLINDAMYCIN HYDROCHLORIDE 300 MG/1
300 CAPSULE ORAL 4 TIMES DAILY
Qty: 40 CAPSULE | Refills: 0 | Status: SHIPPED | OUTPATIENT
Start: 2022-07-13 | End: 2022-12-19

## 2022-07-13 NOTE — PROGRESS NOTES
"Chief Complaint  Dental Pain    Subjective          Osiel Camargo presents to [facility] - [facility]    PCP: Dr. Turner    CC: \"dental pain\"    Has broken tooth, has been told she needs extraction and referred to oral surgeon, but hasn't seen yet.  Last treated for abscess in April.  Abscess got better, but pain has returned.  No swelling or fever noted recently.  Has attempted to make appt with oral surgeon while in office, was told to call back tomorrow.      Dental Pain   This is a recurrent problem. Episode onset: for several months. The problem occurs daily. The problem has been gradually worsening. The pain is at a severity of 9/10. Associated symptoms include thermal sensitivity. Pertinent negatives include no difficulty swallowing, facial pain, fever, oral bleeding or sinus pressure. Treatments tried: last time she was on antibiotics was in April; out of Peridex. The treatment provided mild relief.       Review of Systems   Constitutional: Negative.  Negative for fever.   HENT: Positive for dental problem. Negative for congestion, ear discharge, ear pain, postnasal drip, rhinorrhea, sinus pressure, sinus pain, sneezing, sore throat, tinnitus and trouble swallowing.    Eyes: Negative.    Respiratory: Negative.    Cardiovascular: Negative.    Gastrointestinal: Negative.    Genitourinary: Negative.    Musculoskeletal: Negative.    Skin: Negative.    Neurological: Negative for dizziness and headaches.        Objective   Vital Signs:   /68 (BP Location: Left arm, Patient Position: Sitting, Cuff Size: Large Adult)   Pulse 90   Temp 98 °F (36.7 °C)   Ht 167.6 cm (66\")   Wt 127 kg (280 lb 8 oz)   SpO2 99%   BMI 45.27 kg/m²       Physical Exam  Vitals and nursing note reviewed.   Constitutional:       General: She is not in acute distress.     Appearance: She is obese. She is not ill-appearing.   HENT:      Head: Normocephalic and " atraumatic.      Mouth/Throat:      Dentition: Abnormal dentition. Dental tenderness, gingival swelling and dental abscesses present.     Musculoskeletal:      Cervical back: Neck supple. No tenderness.   Lymphadenopathy:      Cervical: No cervical adenopathy.   Skin:     General: Skin is warm and dry.   Neurological:      General: No focal deficit present.      Mental Status: She is alert and oriented to person, place, and time.   Psychiatric:         Mood and Affect: Mood normal.         Thought Content: Thought content normal.          Result Review :     Common labs    Common Labsle 5/20/22 5/20/22 5/20/22 5/20/22    [id number]   Glucose   86    BUN   11    Creatinine   0.64    Sodium   136    Potassium   4.4    Chloride   103    Calcium   8.6    Albumin   4.00    Total Bilirubin   0.3    Alkaline Phosphatase   62    AST (SGOT)   22    ALT (SGPT)   20    WBC    4.35   Hemoglobin    10.2 (A)   Hematocrit    33.9 (A)   Platelets    283   Total Cholesterol  125     Triglycerides  31     HDL Cholesterol  44     LDL Cholesterol   72     Hemoglobin A1C 5.20      (A) Abnormal value                      Assessment and Plan    Diagnoses and all orders for this visit:    1. Dental abscess (Primary)  -     clindamycin (Cleocin) 300 MG capsule; Take 1 capsule by mouth 4 (Four) Times a Day.  Dispense: 40 capsule; Refill: 0  -     chlorhexidine (Peridex) 0.12 % solution; Apply 15 mL to the mouth or throat 2 (Two) Times a Day.  Dispense: 480 mL; Refill: 0    Other orders  -     fluconazole (Diflucan) 150 MG tablet; 1 tab po x 1 now, may repeat in 4 days prn yeast  Dispense: 2 tablet; Refill: 0      Rx for Clindamycin sent.  Diflucan PRN.   Refill of Peridex provided.   See oral surgeon for probable extraction.     See PCP or RTC if symptoms persist/worsen  See PCP for routine f/u visit and management of chronic medical conditions      This document has been electronically signed by CAMPBELL Hernadez on July 13, 2022  17:12 CDT,.

## 2022-12-19 ENCOUNTER — OFFICE VISIT (OUTPATIENT)
Dept: FAMILY MEDICINE CLINIC | Facility: CLINIC | Age: 27
End: 2022-12-19

## 2022-12-19 VITALS
WEIGHT: 291 LBS | BODY MASS INDEX: 46.77 KG/M2 | TEMPERATURE: 98.3 F | DIASTOLIC BLOOD PRESSURE: 68 MMHG | OXYGEN SATURATION: 99 % | SYSTOLIC BLOOD PRESSURE: 124 MMHG | HEIGHT: 66 IN | HEART RATE: 75 BPM

## 2022-12-19 DIAGNOSIS — K04.7 DENTAL ABSCESS: ICD-10-CM

## 2022-12-19 PROCEDURE — 99213 OFFICE O/P EST LOW 20 MIN: CPT | Performed by: NURSE PRACTITIONER

## 2022-12-19 RX ORDER — CHLORHEXIDINE GLUCONATE 0.12 MG/ML
15 RINSE ORAL 2 TIMES DAILY
Qty: 480 ML | Refills: 0 | Status: SHIPPED | OUTPATIENT
Start: 2022-12-19

## 2022-12-19 RX ORDER — IBUPROFEN 800 MG/1
800 TABLET ORAL EVERY 8 HOURS PRN
Qty: 60 TABLET | Refills: 0 | Status: SHIPPED | OUTPATIENT
Start: 2022-12-19 | End: 2022-12-27

## 2022-12-19 RX ORDER — FLUCONAZOLE 150 MG/1
TABLET ORAL
Qty: 2 TABLET | Refills: 0 | Status: SHIPPED | OUTPATIENT
Start: 2022-12-19 | End: 2022-12-27

## 2022-12-19 RX ORDER — AZITHROMYCIN 250 MG/1
TABLET, FILM COATED ORAL
Qty: 6 TABLET | Refills: 0 | Status: SHIPPED | OUTPATIENT
Start: 2022-12-19 | End: 2022-12-27

## 2022-12-20 NOTE — PROGRESS NOTES
"Chief Complaint  Dental Pain    Subjective          Osiel Camargo presents to [facility] - [place]    History of Present Illness  [facility]    PCP: Dr. Turner    CC: \"dental pain\"    Hx of problems with same tooth in the past, improved with antibiotics.  Last treated in July with Clindamycin, reports she was seen elsewhere since then and given PCN, but didn't work as well.  Has a difficult time remembering to take full course of antibiotics.  Has upcoming appt in [place] to have tooth removed.  Started causing problems--pain/swelling in the last few days.  No fever.        Dental Pain   This is a recurrent problem. Episode onset: x2-3 days. The problem occurs constantly. The problem has been gradually worsening. The pain is at a severity of 10/10. Associated symptoms include facial pain and thermal sensitivity. Pertinent negatives include no difficulty swallowing, fever, oral bleeding or sinus pressure. Treatments tried: still using Peridex from previous Rx. The treatment provided mild relief.       Review of Systems   Constitutional: Negative.  Negative for fever.   HENT: Positive for dental problem. Negative for congestion, ear discharge, ear pain, facial swelling, rhinorrhea, sinus pressure, sinus pain, sneezing, sore throat and trouble swallowing.    Eyes: Negative.    Respiratory: Negative.    Cardiovascular: Negative.    Gastrointestinal: Negative.    Genitourinary: Negative.    Musculoskeletal: Negative.    Skin: Negative.    Neurological: Negative.         Objective   Vital Signs:   /68 (BP Location: Right arm, Patient Position: Sitting, Cuff Size: Large Adult)   Pulse 75   Temp 98.3 °F (36.8 °C)   Ht 167.6 cm (66\")   Wt 132 kg (291 lb)   SpO2 99%   BMI 46.97 kg/m²       Physical Exam  Vitals reviewed.   Constitutional:       General: She is not in acute distress.     Appearance: She is not ill-appearing.   HENT:      Head: " Normocephalic and atraumatic.      Mouth/Throat:     Cardiovascular:      Rate and Rhythm: Normal rate and regular rhythm.   Pulmonary:      Effort: Pulmonary effort is normal. No respiratory distress.      Breath sounds: Normal breath sounds. No wheezing, rhonchi or rales.   Musculoskeletal:      Cervical back: Tenderness present.   Lymphadenopathy:      Cervical: Cervical adenopathy (right anterior) present.   Skin:     General: Skin is warm and dry.   Neurological:      General: No focal deficit present.      Mental Status: She is alert and oriented to person, place, and time.   Psychiatric:         Mood and Affect: Mood normal.         Thought Content: Thought content normal.          Result Review :                 Assessment and Plan    Diagnoses and all orders for this visit:    1. Dental abscess  -     chlorhexidine (Peridex) 0.12 % solution; Apply 15 mL to the mouth or throat 2 (Two) Times a Day.  Dispense: 480 mL; Refill: 0  -     azithromycin (Zithromax Z-Gustabo) 250 MG tablet; Take 2 tablets by mouth on day 1, then 1 tablet daily on days 2-5  Dispense: 6 tablet; Refill: 0  -     ibuprofen (ADVIL,MOTRIN) 800 MG tablet; Take 1 tablet by mouth Every 8 (Eight) Hours As Needed (dental pain).  Dispense: 60 tablet; Refill: 0    Other orders  -     fluconazole (Diflucan) 150 MG tablet; 1 tab po x 1 now, may repeat in 4 days prn yeast  Dispense: 2 tablet; Refill: 0      Continue with Peridex--new Rx given  Rx for Zithromax, Diflucan PRN  Rx for Motrin 800 PRN  Keep scheduled appt with oral surgeon for follow up     See PCP or RTC if symptoms persist/worsen  See PCP for routine f/u visit and management of chronic medical conditions      This document has been electronically signed by CAMPBELL Hernadez on December 19, 2022 18:42 CST,.

## 2022-12-22 ENCOUNTER — TELEPHONE (OUTPATIENT)
Dept: FAMILY MEDICINE CLINIC | Facility: CLINIC | Age: 27
End: 2022-12-22
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
## 2022-12-27 ENCOUNTER — OFFICE VISIT (OUTPATIENT)
Dept: FAMILY MEDICINE CLINIC | Facility: CLINIC | Age: 27
End: 2022-12-27

## 2022-12-27 VITALS
HEART RATE: 70 BPM | HEIGHT: 66 IN | TEMPERATURE: 98 F | OXYGEN SATURATION: 99 % | SYSTOLIC BLOOD PRESSURE: 108 MMHG | BODY MASS INDEX: 46.28 KG/M2 | WEIGHT: 288 LBS | DIASTOLIC BLOOD PRESSURE: 70 MMHG

## 2022-12-27 DIAGNOSIS — Z13.1 ENCOUNTER FOR SCREENING FOR DIABETES MELLITUS: ICD-10-CM

## 2022-12-27 DIAGNOSIS — N64.4 PAINFUL BREASTS: Primary | ICD-10-CM

## 2022-12-27 DIAGNOSIS — Z71.3 WEIGHT LOSS COUNSELING, ENCOUNTER FOR: ICD-10-CM

## 2022-12-27 DIAGNOSIS — D50.9 IRON DEFICIENCY ANEMIA, UNSPECIFIED IRON DEFICIENCY ANEMIA TYPE: Chronic | ICD-10-CM

## 2022-12-27 DIAGNOSIS — Z13.6 ENCOUNTER FOR SCREENING FOR CARDIOVASCULAR DISORDERS: ICD-10-CM

## 2022-12-27 DIAGNOSIS — E55.9 VITAMIN D DEFICIENCY: Chronic | ICD-10-CM

## 2022-12-27 DIAGNOSIS — K04.7 DENTAL ABSCESS: ICD-10-CM

## 2022-12-27 DIAGNOSIS — E66.01 MORBID (SEVERE) OBESITY DUE TO EXCESS CALORIES: ICD-10-CM

## 2022-12-27 PROCEDURE — 99214 OFFICE O/P EST MOD 30 MIN: CPT | Performed by: FAMILY MEDICINE

## 2022-12-27 RX ORDER — FERROUS SULFATE 324(65)MG
324 TABLET, DELAYED RELEASE (ENTERIC COATED) ORAL 2 TIMES DAILY
Qty: 60 TABLET | Refills: 3 | Status: SHIPPED | OUTPATIENT
Start: 2022-12-27

## 2022-12-27 RX ORDER — FOLIC ACID 1 MG/1
1000 TABLET ORAL DAILY
Qty: 90 TABLET | Refills: 1 | Status: SHIPPED | OUTPATIENT
Start: 2022-12-27

## 2022-12-27 RX ORDER — PHENTERMINE HYDROCHLORIDE 37.5 MG/1
37.5 CAPSULE ORAL EVERY MORNING
Qty: 30 CAPSULE | Refills: 0 | Status: SHIPPED | OUTPATIENT
Start: 2022-12-27 | End: 2023-02-03 | Stop reason: SDUPTHER

## 2022-12-27 RX ORDER — LANOLIN ALCOHOL/MO/W.PET/CERES
CREAM (GRAM) TOPICAL
COMMUNITY
Start: 2022-11-29 | End: 2022-12-27 | Stop reason: SDUPTHER

## 2022-12-27 RX ORDER — ERGOCALCIFEROL 1.25 MG/1
50000 CAPSULE ORAL WEEKLY
Qty: 5 CAPSULE | Refills: 3 | Status: SHIPPED | OUTPATIENT
Start: 2022-12-27 | End: 2023-02-03 | Stop reason: SDUPTHER

## 2022-12-27 RX ORDER — LANOLIN ALCOHOL/MO/W.PET/CERES
1000 CREAM (GRAM) TOPICAL DAILY
Qty: 30 TABLET | Refills: 3 | Status: SHIPPED | OUTPATIENT
Start: 2022-12-27 | End: 2023-02-03 | Stop reason: SDUPTHER

## 2022-12-27 NOTE — PATIENT INSTRUCTIONS
New labwork fasting    Will refer to Dr. Dykes for bariatric surgery    Will refer to DR. Hinton in [place] for breast reduction    Phetnermine daily for weight loss    Recheck in 1 month

## 2022-12-29 ENCOUNTER — TELEPHONE (OUTPATIENT)
Dept: FAMILY MEDICINE CLINIC | Facility: CLINIC | Age: 27
End: 2022-12-29

## 2022-12-29 ENCOUNTER — LAB (OUTPATIENT)
Dept: LAB | Facility: HOSPITAL | Age: 27
End: 2022-12-29
Payer: COMMERCIAL

## 2022-12-29 DIAGNOSIS — D50.9 IRON DEFICIENCY ANEMIA, UNSPECIFIED IRON DEFICIENCY ANEMIA TYPE: Chronic | ICD-10-CM

## 2022-12-29 DIAGNOSIS — Z13.1 ENCOUNTER FOR SCREENING FOR DIABETES MELLITUS: ICD-10-CM

## 2022-12-29 DIAGNOSIS — E66.01 MORBID (SEVERE) OBESITY DUE TO EXCESS CALORIES: ICD-10-CM

## 2022-12-29 DIAGNOSIS — E55.9 VITAMIN D DEFICIENCY: Chronic | ICD-10-CM

## 2022-12-29 DIAGNOSIS — K04.7 DENTAL ABSCESS: ICD-10-CM

## 2022-12-29 DIAGNOSIS — Z13.6 ENCOUNTER FOR SCREENING FOR CARDIOVASCULAR DISORDERS: ICD-10-CM

## 2022-12-29 LAB
ALBUMIN SERPL-MCNC: 3.9 G/DL (ref 3.5–5.2)
ALBUMIN/GLOB SERPL: 1.3 G/DL
ALP SERPL-CCNC: 65 U/L (ref 39–117)
ALT SERPL W P-5'-P-CCNC: 12 U/L (ref 1–33)
ANION GAP SERPL CALCULATED.3IONS-SCNC: 8.5 MMOL/L (ref 5–15)
AST SERPL-CCNC: 15 U/L (ref 1–32)
BASOPHILS # BLD AUTO: 0.03 10*3/MM3 (ref 0–0.2)
BASOPHILS NFR BLD AUTO: 0.7 % (ref 0–1.5)
BILIRUB SERPL-MCNC: 0.3 MG/DL (ref 0–1.2)
BUN SERPL-MCNC: 13 MG/DL (ref 6–20)
BUN/CREAT SERPL: 15.9 (ref 7–25)
CALCIUM SPEC-SCNC: 8.8 MG/DL (ref 8.6–10.5)
CHLORIDE SERPL-SCNC: 102 MMOL/L (ref 98–107)
CHOLEST SERPL-MCNC: 143 MG/DL (ref 0–200)
CO2 SERPL-SCNC: 25.5 MMOL/L (ref 22–29)
CREAT SERPL-MCNC: 0.82 MG/DL (ref 0.57–1)
DEPRECATED RDW RBC AUTO: 41.5 FL (ref 37–54)
EGFRCR SERPLBLD CKD-EPI 2021: 101.3 ML/MIN/1.73
EOSINOPHIL # BLD AUTO: 0.07 10*3/MM3 (ref 0–0.4)
EOSINOPHIL NFR BLD AUTO: 1.5 % (ref 0.3–6.2)
ERYTHROCYTE [DISTWIDTH] IN BLOOD BY AUTOMATED COUNT: 14.9 % (ref 12.3–15.4)
FERRITIN SERPL-MCNC: 47.1 NG/ML (ref 13–150)
GLOBULIN UR ELPH-MCNC: 3.1 GM/DL
GLUCOSE SERPL-MCNC: 97 MG/DL (ref 65–99)
HBA1C MFR BLD: 6 % (ref 4.8–5.6)
HCT VFR BLD AUTO: 33.9 % (ref 34–46.6)
HDLC SERPL-MCNC: 47 MG/DL (ref 40–60)
HGB BLD-MCNC: 10.9 G/DL (ref 12–15.9)
IMM GRANULOCYTES # BLD AUTO: 0.01 10*3/MM3 (ref 0–0.05)
IMM GRANULOCYTES NFR BLD AUTO: 0.2 % (ref 0–0.5)
IRON 24H UR-MRATE: 32 MCG/DL (ref 37–145)
IRON SATN MFR SERPL: 7 % (ref 20–50)
LDLC SERPL CALC-MCNC: 87 MG/DL (ref 0–100)
LDLC/HDLC SERPL: 1.88 {RATIO}
LYMPHOCYTES # BLD AUTO: 1.67 10*3/MM3 (ref 0.7–3.1)
LYMPHOCYTES NFR BLD AUTO: 36.8 % (ref 19.6–45.3)
MCH RBC QN AUTO: 24.3 PG (ref 26.6–33)
MCHC RBC AUTO-ENTMCNC: 32.2 G/DL (ref 31.5–35.7)
MCV RBC AUTO: 75.7 FL (ref 79–97)
MONOCYTES # BLD AUTO: 0.34 10*3/MM3 (ref 0.1–0.9)
MONOCYTES NFR BLD AUTO: 7.5 % (ref 5–12)
NEUTROPHILS NFR BLD AUTO: 2.42 10*3/MM3 (ref 1.7–7)
NEUTROPHILS NFR BLD AUTO: 53.3 % (ref 42.7–76)
NRBC BLD AUTO-RTO: 0 /100 WBC (ref 0–0.2)
PLATELET # BLD AUTO: 284 10*3/MM3 (ref 140–450)
PMV BLD AUTO: 12.2 FL (ref 6–12)
POTASSIUM SERPL-SCNC: 4.1 MMOL/L (ref 3.5–5.2)
PROT SERPL-MCNC: 7 G/DL (ref 6–8.5)
RBC # BLD AUTO: 4.48 10*6/MM3 (ref 3.77–5.28)
SODIUM SERPL-SCNC: 136 MMOL/L (ref 136–145)
TIBC SERPL-MCNC: 474 MCG/DL (ref 298–536)
TRANSFERRIN SERPL-MCNC: 318 MG/DL (ref 200–360)
TRIGL SERPL-MCNC: 38 MG/DL (ref 0–150)
VLDLC SERPL-MCNC: 9 MG/DL (ref 5–40)
WBC NRBC COR # BLD: 4.54 10*3/MM3 (ref 3.4–10.8)

## 2022-12-29 PROCEDURE — 83540 ASSAY OF IRON: CPT

## 2022-12-29 PROCEDURE — 84466 ASSAY OF TRANSFERRIN: CPT

## 2022-12-29 PROCEDURE — 85025 COMPLETE CBC W/AUTO DIFF WBC: CPT

## 2022-12-29 PROCEDURE — 83036 HEMOGLOBIN GLYCOSYLATED A1C: CPT

## 2022-12-29 PROCEDURE — 80061 LIPID PANEL: CPT

## 2022-12-29 PROCEDURE — 82728 ASSAY OF FERRITIN: CPT

## 2022-12-29 PROCEDURE — 80053 COMPREHEN METABOLIC PANEL: CPT

## 2022-12-29 NOTE — TELEPHONE ENCOUNTER
Patient called stating her phentermine medication was not at the pharmacy and that the pharmacy said they didn't receive the script.   Call back number: [phone]

## 2022-12-30 ENCOUNTER — TELEPHONE (OUTPATIENT)
Dept: FAMILY MEDICINE CLINIC | Facility: CLINIC | Age: 27
End: 2022-12-30
Payer: COMMERCIAL

## 2022-12-30 NOTE — TELEPHONE ENCOUNTER
----- Message from Adarsh Turner MD sent at 12/29/2022 10:49 PM CST -----  Please let pt know hga1c is at 6.00 and pt is prediabetic and at increased risk for Diabetes type 2. Recommend weight loss exercise and diet. Also recommend pt start on Metformin  mg daily to help with insulin and sugar control if pt agreeable give 30 pills and 3 refills. May cause GI upset and diarrhea     Pt has low iron levels along with CBC showing hemoglobin at 10.9.  pt has iron deficiency anemia and recommend continuing on iron supplements. If pt is having heavy menstrual cycle may need transvaginal US in future. Also pt would benefit with Iron IV therapy in future with Hematology.      On CMP kidney and liver function stable      Recheck on next visit thanks

## 2023-01-04 ENCOUNTER — TELEPHONE (OUTPATIENT)
Dept: FAMILY MEDICINE CLINIC | Facility: CLINIC | Age: 28
End: 2023-01-04
Payer: COMMERCIAL

## 2023-01-04 RX ORDER — METFORMIN HYDROCHLORIDE 500 MG/1
500 TABLET, EXTENDED RELEASE ORAL
Qty: 30 TABLET | Refills: 3 | Status: SHIPPED | OUTPATIENT
Start: 2023-01-04 | End: 2023-03-15

## 2023-01-04 NOTE — TELEPHONE ENCOUNTER
Gave pt results and recommendations. She voiced understanding and stated she does not take iron supplement as she should but will start. She stated she doesn't have heavy periods all the time. She is agreeable to Metformin.

## 2023-01-09 ENCOUNTER — TELEPHONE (OUTPATIENT)
Dept: FAMILY MEDICINE CLINIC | Facility: CLINIC | Age: 28
End: 2023-01-09
Payer: COMMERCIAL

## 2023-01-09 NOTE — TELEPHONE ENCOUNTER
Pt called and asked if she could take Metformin with Phentermine. Made pt aware that  would not have prescribed the medication if she could not take them together. She voiced understanding. She asked if she could take a half of Phentermine instead of a whole one. Made aware she could.

## 2023-01-30 ENCOUNTER — TELEPHONE (OUTPATIENT)
Dept: FAMILY MEDICINE CLINIC | Facility: CLINIC | Age: 28
End: 2023-01-30
Payer: COMMERCIAL
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
## 2023-02-03 ENCOUNTER — OFFICE VISIT (OUTPATIENT)
Dept: FAMILY MEDICINE CLINIC | Facility: CLINIC | Age: 28
End: 2023-02-03
Payer: COMMERCIAL

## 2023-02-03 VITALS
DIASTOLIC BLOOD PRESSURE: 62 MMHG | SYSTOLIC BLOOD PRESSURE: 116 MMHG | TEMPERATURE: 97.7 F | HEIGHT: 66 IN | BODY MASS INDEX: 45 KG/M2 | OXYGEN SATURATION: 94 % | WEIGHT: 280 LBS | HEART RATE: 100 BPM

## 2023-02-03 DIAGNOSIS — E55.9 VITAMIN D DEFICIENCY: Chronic | ICD-10-CM

## 2023-02-03 DIAGNOSIS — E53.8 VITAMIN B12 DEFICIENCY: ICD-10-CM

## 2023-02-03 DIAGNOSIS — R73.03 PREDIABETES: ICD-10-CM

## 2023-02-03 DIAGNOSIS — E66.01 MORBID (SEVERE) OBESITY DUE TO EXCESS CALORIES: Primary | ICD-10-CM

## 2023-02-03 DIAGNOSIS — D50.9 IRON DEFICIENCY ANEMIA, UNSPECIFIED IRON DEFICIENCY ANEMIA TYPE: Chronic | ICD-10-CM

## 2023-02-03 DIAGNOSIS — Z71.3 WEIGHT LOSS COUNSELING, ENCOUNTER FOR: ICD-10-CM

## 2023-02-03 PROCEDURE — 99214 OFFICE O/P EST MOD 30 MIN: CPT | Performed by: FAMILY MEDICINE

## 2023-02-03 RX ORDER — PHENTERMINE HYDROCHLORIDE 37.5 MG/1
37.5 CAPSULE ORAL EVERY MORNING
Qty: 30 CAPSULE | Refills: 0 | Status: SHIPPED | OUTPATIENT
Start: 2023-02-03 | End: 2023-03-20

## 2023-02-03 RX ORDER — ERGOCALCIFEROL 1.25 MG/1
50000 CAPSULE ORAL WEEKLY
Qty: 5 CAPSULE | Refills: 3 | Status: SHIPPED | OUTPATIENT
Start: 2023-02-03

## 2023-02-03 RX ORDER — LANOLIN ALCOHOL/MO/W.PET/CERES
1000 CREAM (GRAM) TOPICAL DAILY
Qty: 30 TABLET | Refills: 3 | Status: SHIPPED | OUTPATIENT
Start: 2023-02-03
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
## 2023-03-15 ENCOUNTER — OFFICE VISIT (OUTPATIENT)
Dept: FAMILY MEDICINE CLINIC | Facility: CLINIC | Age: 28
End: 2023-03-15
Payer: COMMERCIAL

## 2023-03-15 VITALS
HEART RATE: 82 BPM | BODY MASS INDEX: 44.36 KG/M2 | DIASTOLIC BLOOD PRESSURE: 72 MMHG | TEMPERATURE: 98.1 F | WEIGHT: 276 LBS | OXYGEN SATURATION: 100 % | SYSTOLIC BLOOD PRESSURE: 110 MMHG | HEIGHT: 66 IN

## 2023-03-15 DIAGNOSIS — S89.91XA INJURY OF RIGHT KNEE, INITIAL ENCOUNTER: ICD-10-CM

## 2023-03-15 DIAGNOSIS — R20.0 NUMBNESS AND TINGLING OF RIGHT LOWER EXTREMITY: Primary | ICD-10-CM

## 2023-03-15 DIAGNOSIS — R20.2 NUMBNESS AND TINGLING OF RIGHT LOWER EXTREMITY: Primary | ICD-10-CM

## 2023-03-15 DIAGNOSIS — R25.2 MUSCLE CRAMPS: ICD-10-CM

## 2023-03-15 PROCEDURE — 99214 OFFICE O/P EST MOD 30 MIN: CPT | Performed by: NURSE PRACTITIONER

## 2023-03-15 NOTE — PROGRESS NOTES
"Chief Complaint  Pain ( Rt knee pain X 1 wk)    Subjective          Osiel Camargo presents to [facility] [place]    History of Present Illness  [facility]    PCP: Dr. Turner    CC: \"knee pain\"    C/O intermittent right knee \"aches\" and \"numbness and tingling\" for a while.  Reports seems worse in the last week.  Reports she often wears heels and turns her ankle and concerned she may have done something to the knee.  Also c/o intermittent muscle cramps/spasms in bilateral lower legs.  Seen by PCP in February, has labs ordered that she hasn't had done yet.  Hx of Vit D deficiency and B12 deficiency and pre diabetes.       Knee Pain   The incident occurred more than 1 week ago. The incident occurred at home. The injury mechanism is unknown (possible twisting injury, frequently turns her ankle when wearing heels and has to catch herself). The pain is present in the right knee. The quality of the pain is described as aching. The pain is at a severity of 3/10. The pain has been fluctuating since onset. Associated symptoms include numbness and tingling. Pertinent negatives include no inability to bear weight, loss of motion, loss of sensation or muscle weakness. She reports no foreign bodies present. Nothing aggravates the symptoms. She has tried nothing for the symptoms. The treatment provided no relief.       Review of Systems   Constitutional: Negative.    Respiratory: Negative.    Cardiovascular: Negative.    Gastrointestinal: Negative.    Genitourinary: Negative.    Musculoskeletal: Positive for arthralgias (right knee pain). Negative for back pain, neck pain and neck stiffness. Myalgias:  c/o muscle cramps in legs.   Skin: Negative.    Neurological: Positive for tingling and numbness. Negative for dizziness and headaches.        Objective   Vital Signs:   /72 (BP Location: Right arm, Patient Position: Sitting, Cuff Size: Large Adult)   Pulse 82   " "Temp 98.1 °F (36.7 °C) (Oral)   Ht 167.6 cm (66\")   Wt 125 kg (276 lb)   SpO2 100%   BMI 44.55 kg/m²       Physical Exam  Vitals and nursing note reviewed.   Constitutional:       General: She is not in acute distress.     Appearance: She is not ill-appearing.   HENT:      Head: Normocephalic and atraumatic.   Cardiovascular:      Rate and Rhythm: Normal rate and regular rhythm.      Pulses:           Popliteal pulses are 3+ on the right side and 3+ on the left side.        Dorsalis pedis pulses are 3+ on the right side and 3+ on the left side.   Pulmonary:      Effort: Pulmonary effort is normal. No respiratory distress.      Breath sounds: Normal breath sounds. No wheezing, rhonchi or rales.   Musculoskeletal:         General: No swelling.      Cervical back: Neck supple.      Right knee: Normal.      Left knee: Normal.      Comments: C/O aching in right knee, but no pain with palpation or movement   Skin:     General: Skin is warm and dry.      Capillary Refill: Capillary refill takes less than 2 seconds.      Findings: No erythema or rash.   Neurological:      General: No focal deficit present.      Mental Status: She is alert and oriented to person, place, and time.   Psychiatric:         Mood and Affect: Mood normal.         Thought Content: Thought content normal.          Result Review :     Common labs    Common Labs 5/20/22 5/20/22 5/20/22 5/20/22 12/29/22 12/29/22 12/29/22 12/29/22    [id number] 0956 [id number] [id number] [id number] [id number]   Glucose   86   97     BUN   11   13     Creatinine   0.64   0.82     Sodium   136   136     Potassium   4.4   4.1     Chloride   103   102     Calcium   8.6   8.8     Albumin   4.00   3.9     Total Bilirubin   0.3   0.3     Alkaline Phosphatase   62   65     AST (SGOT)   22   15     ALT (SGPT)   20   12     WBC    4.35 4.54      Hemoglobin    10.2 (A) 10.9 (A)      Hematocrit    33.9 (A) 33.9 (A)      Platelets    283 284      Total Cholesterol  125     143    Triglycerides  31   "   38    HDL Cholesterol  44     47    LDL Cholesterol   72     87    Hemoglobin A1C 5.20       6.00 (A)   (A) Abnormal value            TSH    TSH 5/20/22   TSH 0.532           XR knee 4+ vw right    Result Date: 3/15/2023  CONCLUSION: No fracture or dislocation [id number] Electronically signed by:  Franklin Alves MD  3/15/2023 5:11 PM CDT Workstation: [id number]              Assessment and Plan    Diagnoses and all orders for this visit:    1. Numbness and tingling of right lower extremity (Primary)  Comments:  intermittent    2. Injury of right knee, initial encounter  -     XR knee 4+ vw right    3. Muscle cramps  -     TSH Rfx On Abnormal To Free T4; Future  -     Magnesium; Future      Knee xrays normal.   Encouraged to get labs as ordered by PCP including CBC, CMP, Vit D, B12, Iron.  Will add TSH, magnesium.   Stay well hydrated    If labs all normal, numbness/tingling in knee/leg persist, may need to consider neurology referral for nerve conduction studies.     See PCP or RTC if symptoms persist/worsen  See PCP for routine f/u visit and management of chronic medical conditions      This document has been electronically signed by CAMPBELL Hernadez on March 15, 2023 17:50 CDT,.

## 2023-03-20 DIAGNOSIS — E66.01 MORBID (SEVERE) OBESITY DUE TO EXCESS CALORIES: ICD-10-CM

## 2023-03-20 RX ORDER — PHENTERMINE HYDROCHLORIDE 37.5 MG/1
TABLET ORAL
Qty: 30 TABLET | Refills: 0 | Status: SHIPPED | OUTPATIENT
Start: 2023-03-20

## 2023-03-20 NOTE — TELEPHONE ENCOUNTER
Rx Refill Note  Requested Prescriptions     Pending Prescriptions Disp Refills   • phentermine (ADIPEX-P) 37.5 MG tablet [Pharmacy Med Name: phentermine 37.5 mg tablet] 30 tablet 0     Sig: TAKE ONE TABLET BY MOUTH EVERY MORNING      Last office visit with prescribing clinician: 2/3/2023   Last telemedicine visit with prescribing clinician: 3/30/2023   Next office visit with prescribing clinician: 3/30/2023   {TIP  Encounters:     Rx Controlled Substance Protocol Failed 03/20/2023 08:43 AM   Protocol Details  Urine Toxicology Performed in Last 12 Months    Medication not refilled in past 28 days            {TIP  Please add Last Relevant Lab Date if appropriate              {TIP  Is Refill Pharmacy correct? yes    Would you like a call back once the refill request has been completed: [] Yes [] No    If the office needs to give you a call back, can they leave a voicemail: [] Yes [] No    Brian Bar LPN  03/20/23, 08:49 CDT

## 2023-03-30 ENCOUNTER — OFFICE VISIT (OUTPATIENT)
Dept: FAMILY MEDICINE CLINIC | Facility: CLINIC | Age: 28
End: 2023-03-30
Payer: COMMERCIAL

## 2023-03-30 VITALS
SYSTOLIC BLOOD PRESSURE: 120 MMHG | HEART RATE: 74 BPM | DIASTOLIC BLOOD PRESSURE: 76 MMHG | BODY MASS INDEX: 43.75 KG/M2 | WEIGHT: 272.2 LBS | OXYGEN SATURATION: 99 % | HEIGHT: 66 IN

## 2023-03-30 DIAGNOSIS — E53.8 VITAMIN B12 DEFICIENCY: ICD-10-CM

## 2023-03-30 DIAGNOSIS — R73.03 PREDIABETES: ICD-10-CM

## 2023-03-30 DIAGNOSIS — N39.0 URINARY TRACT INFECTION WITHOUT HEMATURIA, SITE UNSPECIFIED: Primary | ICD-10-CM

## 2023-03-30 DIAGNOSIS — D50.9 IRON DEFICIENCY ANEMIA, UNSPECIFIED IRON DEFICIENCY ANEMIA TYPE: Chronic | ICD-10-CM

## 2023-03-30 DIAGNOSIS — N39.0 URINARY TRACT INFECTION WITHOUT HEMATURIA, SITE UNSPECIFIED: ICD-10-CM

## 2023-03-30 DIAGNOSIS — E66.01 MORBID (SEVERE) OBESITY DUE TO EXCESS CALORIES: Primary | ICD-10-CM

## 2023-03-30 DIAGNOSIS — E55.9 VITAMIN D DEFICIENCY: Chronic | ICD-10-CM

## 2023-03-30 DIAGNOSIS — Z71.3 WEIGHT LOSS COUNSELING, ENCOUNTER FOR: ICD-10-CM

## 2023-03-30 PROCEDURE — 87086 URINE CULTURE/COLONY COUNT: CPT | Performed by: FAMILY MEDICINE

## 2023-03-30 PROCEDURE — 81001 URINALYSIS AUTO W/SCOPE: CPT | Performed by: FAMILY MEDICINE

## 2023-03-31 LAB
BACTERIA UR QL AUTO: ABNORMAL /HPF
BILIRUB UR QL STRIP: NEGATIVE
CLARITY UR: CLEAR
COLOR UR: YELLOW
GLUCOSE UR STRIP-MCNC: NEGATIVE MG/DL
HGB UR QL STRIP.AUTO: NEGATIVE
HYALINE CASTS UR QL AUTO: ABNORMAL /LPF
KETONES UR QL STRIP: NEGATIVE
LEUKOCYTE ESTERASE UR QL STRIP.AUTO: ABNORMAL
NITRITE UR QL STRIP: NEGATIVE
PH UR STRIP.AUTO: 7.5 [PH] (ref 5–8)
PROT UR QL STRIP: NEGATIVE
RBC # UR STRIP: ABNORMAL /HPF
REF LAB TEST METHOD: ABNORMAL
SP GR UR STRIP: 1.01 (ref 1–1.03)
SQUAMOUS #/AREA URNS HPF: ABNORMAL /HPF
UROBILINOGEN UR QL STRIP: ABNORMAL
WBC # UR STRIP: ABNORMAL /HPF
YEAST URNS QL MICRO: ABNORMAL /HPF

## 2023-04-01 LAB — BACTERIA SPEC AEROBE CULT: NORMAL

## 2023-04-04 ENCOUNTER — TELEPHONE (OUTPATIENT)
Dept: FAMILY MEDICINE CLINIC | Facility: CLINIC | Age: 28
End: 2023-04-04
Payer: COMMERCIAL

## 2023-04-04 RX ORDER — NITROFURANTOIN 25; 75 MG/1; MG/1
100 CAPSULE ORAL 2 TIMES DAILY
Qty: 14 CAPSULE | Refills: 0 | Status: SHIPPED | OUTPATIENT
Start: 2023-04-04 | End: 2023-04-11

## 2023-04-04 NOTE — TELEPHONE ENCOUNTER
----- Message from Adarsh Turner MD sent at 4/1/2023  8:39 AM CDT -----  Pt may have UTI with presences of squamous epithelial cells and leukocyte esterase. If pt not allergic start on macrobid 100 mg PO BID for 7 days give 14 pills and no refills.  Awaiting urine culture will update with results    Thanks

## 2023-04-04 NOTE — TELEPHONE ENCOUNTER
----- Message from Adarsh Turner MD sent at 4/1/2023 12:24 PM CDT -----  Has mixed raul in urine culture proceed with macrobid  if having symptoms    Recheck on next visit thanks

## 2023-04-17 ENCOUNTER — TELEPHONE (OUTPATIENT)
Dept: FAMILY MEDICINE CLINIC | Facility: CLINIC | Age: 28
End: 2023-04-17

## 2023-04-17 DIAGNOSIS — N39.0 URINARY TRACT INFECTION WITHOUT HEMATURIA, SITE UNSPECIFIED: Primary | ICD-10-CM

## 2023-04-17 RX ORDER — SULFAMETHOXAZOLE AND TRIMETHOPRIM 800; 160 MG/1; MG/1
1 TABLET ORAL 2 TIMES DAILY
Qty: 20 TABLET | Refills: 0 | Status: SHIPPED | OUTPATIENT
Start: 2023-04-17 | End: 2023-04-27

## 2023-04-17 NOTE — TELEPHONE ENCOUNTER
Made pt aware and she voiced understanding. She stated she believes penicillin will work better and would prefer to have that.

## 2023-04-17 NOTE — TELEPHONE ENCOUNTER
Patient called stating the nitrofurantoin is not working and asked if Dr. Turner would send in a different antibiotic. She said she had peen prescribed amoxicillin before and it helped.  Call back number: [phone]

## 2023-04-18 ENCOUNTER — LAB (OUTPATIENT)
Dept: LAB | Facility: HOSPITAL | Age: 28
End: 2023-04-18
Payer: COMMERCIAL

## 2023-04-18 DIAGNOSIS — E66.01 MORBID (SEVERE) OBESITY DUE TO EXCESS CALORIES: ICD-10-CM

## 2023-04-18 DIAGNOSIS — R25.2 MUSCLE CRAMPS: ICD-10-CM

## 2023-04-18 DIAGNOSIS — R73.03 PREDIABETES: ICD-10-CM

## 2023-04-18 DIAGNOSIS — E55.9 VITAMIN D DEFICIENCY: Chronic | ICD-10-CM

## 2023-04-18 DIAGNOSIS — Z71.3 WEIGHT LOSS COUNSELING, ENCOUNTER FOR: ICD-10-CM

## 2023-04-18 DIAGNOSIS — N39.0 URINARY TRACT INFECTION WITHOUT HEMATURIA, SITE UNSPECIFIED: ICD-10-CM

## 2023-04-18 DIAGNOSIS — E53.8 VITAMIN B12 DEFICIENCY: ICD-10-CM

## 2023-04-18 DIAGNOSIS — D50.9 IRON DEFICIENCY ANEMIA, UNSPECIFIED IRON DEFICIENCY ANEMIA TYPE: Chronic | ICD-10-CM

## 2023-04-18 LAB
25(OH)D3 SERPL-MCNC: 23.5 NG/ML (ref 30–100)
ALBUMIN SERPL-MCNC: 4.1 G/DL (ref 3.5–5.2)
ALBUMIN/GLOB SERPL: 1.3 G/DL
ALP SERPL-CCNC: 67 U/L (ref 39–117)
ALT SERPL W P-5'-P-CCNC: 10 U/L (ref 1–33)
ANION GAP SERPL CALCULATED.3IONS-SCNC: 8 MMOL/L (ref 5–15)
AST SERPL-CCNC: 13 U/L (ref 1–32)
BACTERIA UR QL AUTO: ABNORMAL /HPF
BASOPHILS # BLD AUTO: 0.02 10*3/MM3 (ref 0–0.2)
BASOPHILS NFR BLD AUTO: 0.5 % (ref 0–1.5)
BILIRUB SERPL-MCNC: 0.3 MG/DL (ref 0–1.2)
BILIRUB UR QL STRIP: NEGATIVE
BUN SERPL-MCNC: 11 MG/DL (ref 6–20)
BUN/CREAT SERPL: 14.3 (ref 7–25)
CALCIUM SPEC-SCNC: 8.6 MG/DL (ref 8.6–10.5)
CHLORIDE SERPL-SCNC: 101 MMOL/L (ref 98–107)
CHOLEST SERPL-MCNC: 135 MG/DL (ref 0–200)
CLARITY UR: CLEAR
CO2 SERPL-SCNC: 28 MMOL/L (ref 22–29)
COLOR UR: YELLOW
CREAT SERPL-MCNC: 0.77 MG/DL (ref 0.57–1)
DEPRECATED RDW RBC AUTO: 42 FL (ref 37–54)
EGFRCR SERPLBLD CKD-EPI 2021: 109.3 ML/MIN/1.73
EOSINOPHIL # BLD AUTO: 0.05 10*3/MM3 (ref 0–0.4)
EOSINOPHIL NFR BLD AUTO: 1.2 % (ref 0.3–6.2)
ERYTHROCYTE [DISTWIDTH] IN BLOOD BY AUTOMATED COUNT: 15.1 % (ref 12.3–15.4)
GLOBULIN UR ELPH-MCNC: 3.1 GM/DL
GLUCOSE SERPL-MCNC: 95 MG/DL (ref 65–99)
GLUCOSE UR STRIP-MCNC: NEGATIVE MG/DL
HBA1C MFR BLD: 5.6 % (ref 4.8–5.6)
HCT VFR BLD AUTO: 34.6 % (ref 34–46.6)
HDLC SERPL-MCNC: 48 MG/DL (ref 40–60)
HGB BLD-MCNC: 10.9 G/DL (ref 12–15.9)
HGB UR QL STRIP.AUTO: NEGATIVE
HYALINE CASTS UR QL AUTO: ABNORMAL /LPF
IMM GRANULOCYTES # BLD AUTO: 0.01 10*3/MM3 (ref 0–0.05)
IMM GRANULOCYTES NFR BLD AUTO: 0.2 % (ref 0–0.5)
IRON 24H UR-MRATE: 35 MCG/DL (ref 37–145)
IRON SATN MFR SERPL: 8 % (ref 20–50)
KETONES UR QL STRIP: NEGATIVE
LDLC SERPL CALC-MCNC: 77 MG/DL (ref 0–100)
LDLC/HDLC SERPL: 1.65 {RATIO}
LEUKOCYTE ESTERASE UR QL STRIP.AUTO: ABNORMAL
LYMPHOCYTES # BLD AUTO: 1.95 10*3/MM3 (ref 0.7–3.1)
LYMPHOCYTES NFR BLD AUTO: 48.1 % (ref 19.6–45.3)
MAGNESIUM SERPL-MCNC: 1.9 MG/DL (ref 1.6–2.6)
MCH RBC QN AUTO: 24.5 PG (ref 26.6–33)
MCHC RBC AUTO-ENTMCNC: 31.5 G/DL (ref 31.5–35.7)
MCV RBC AUTO: 77.9 FL (ref 79–97)
MONOCYTES # BLD AUTO: 0.31 10*3/MM3 (ref 0.1–0.9)
MONOCYTES NFR BLD AUTO: 7.7 % (ref 5–12)
NEUTROPHILS NFR BLD AUTO: 1.71 10*3/MM3 (ref 1.7–7)
NEUTROPHILS NFR BLD AUTO: 42.3 % (ref 42.7–76)
NITRITE UR QL STRIP: NEGATIVE
NRBC BLD AUTO-RTO: 0 /100 WBC (ref 0–0.2)
PH UR STRIP.AUTO: 6.5 [PH] (ref 5–8)
PLATELET # BLD AUTO: 286 10*3/MM3 (ref 140–450)
PMV BLD AUTO: 12.3 FL (ref 6–12)
POTASSIUM SERPL-SCNC: 3.8 MMOL/L (ref 3.5–5.2)
PROT SERPL-MCNC: 7.2 G/DL (ref 6–8.5)
PROT UR QL STRIP: NEGATIVE
RBC # BLD AUTO: 4.44 10*6/MM3 (ref 3.77–5.28)
RBC # UR STRIP: ABNORMAL /HPF
REF LAB TEST METHOD: ABNORMAL
SODIUM SERPL-SCNC: 137 MMOL/L (ref 136–145)
SP GR UR STRIP: 1.02 (ref 1–1.03)
SQUAMOUS #/AREA URNS HPF: ABNORMAL /HPF
TIBC SERPL-MCNC: 435 MCG/DL (ref 298–536)
TRANSFERRIN SERPL-MCNC: 292 MG/DL (ref 200–360)
TRIGL SERPL-MCNC: 39 MG/DL (ref 0–150)
TSH SERPL DL<=0.05 MIU/L-ACNC: 0.56 UIU/ML (ref 0.27–4.2)
UROBILINOGEN UR QL STRIP: ABNORMAL
VIT B12 BLD-MCNC: 865 PG/ML (ref 211–946)
VLDLC SERPL-MCNC: 10 MG/DL (ref 5–40)
WBC # UR STRIP: ABNORMAL /HPF
WBC NRBC COR # BLD: 4.05 10*3/MM3 (ref 3.4–10.8)

## 2023-04-18 PROCEDURE — 83525 ASSAY OF INSULIN: CPT

## 2023-04-18 PROCEDURE — 84466 ASSAY OF TRANSFERRIN: CPT

## 2023-04-18 PROCEDURE — 83540 ASSAY OF IRON: CPT

## 2023-04-18 PROCEDURE — 82607 VITAMIN B-12: CPT

## 2023-04-18 PROCEDURE — 80053 COMPREHEN METABOLIC PANEL: CPT

## 2023-04-18 PROCEDURE — 80061 LIPID PANEL: CPT

## 2023-04-18 PROCEDURE — 84443 ASSAY THYROID STIM HORMONE: CPT

## 2023-04-18 PROCEDURE — 81001 URINALYSIS AUTO W/SCOPE: CPT

## 2023-04-18 PROCEDURE — 83735 ASSAY OF MAGNESIUM: CPT

## 2023-04-18 PROCEDURE — 85025 COMPLETE CBC W/AUTO DIFF WBC: CPT

## 2023-04-18 PROCEDURE — 87086 URINE CULTURE/COLONY COUNT: CPT

## 2023-04-18 PROCEDURE — 83036 HEMOGLOBIN GLYCOSYLATED A1C: CPT

## 2023-04-18 PROCEDURE — 82306 VITAMIN D 25 HYDROXY: CPT

## 2023-04-19 ENCOUNTER — TELEPHONE (OUTPATIENT)
Dept: FAMILY MEDICINE CLINIC | Facility: CLINIC | Age: 28
End: 2023-04-19
Payer: COMMERCIAL

## 2023-04-19 DIAGNOSIS — D50.9 IRON DEFICIENCY ANEMIA, UNSPECIFIED IRON DEFICIENCY ANEMIA TYPE: Chronic | ICD-10-CM

## 2023-04-19 LAB
BACTERIA SPEC AEROBE CULT: NO GROWTH
INSULIN SERPL-ACNC: 20.4 UIU/ML (ref 2.6–24.9)

## 2023-04-19 RX ORDER — DOCUSATE SODIUM 100 MG/1
100 CAPSULE, LIQUID FILLED ORAL 3 TIMES DAILY PRN
Qty: 90 CAPSULE | Refills: 3 | Status: SHIPPED | OUTPATIENT
Start: 2023-04-19

## 2023-04-19 RX ORDER — FLUCONAZOLE 150 MG/1
TABLET ORAL
Qty: 2 TABLET | Refills: 0 | Status: SHIPPED | OUTPATIENT
Start: 2023-04-19

## 2023-04-19 NOTE — TELEPHONE ENCOUNTER
----- Message from Adarsh Turner MD sent at 4/19/2023  4:27 PM CDT -----  Pt shows no growth on urine culture. Likey acute cystitis    Proceed with taking Bactrim until finished. If still having symptoms would consider another medication    Recheck on next visit thanks

## 2023-04-19 NOTE — TELEPHONE ENCOUNTER
----- Message from Adarsh Turner MD sent at 4/19/2023  7:18 AM CDT -----  Please call pt    Vitamin D continues to be low continue with vitamin D supplement    Iron studies shows low iron and iron saturation and on CBC pt is anemic with hemoglobin stable at 10.9.  please inquire if pt is having heavy menstrual cycle. If so she may need to have a transvaginal US and see her OB/GYN. She was referred to OB/GYN in past in December 2022. She will need to call and make an appt. She is to continue on her iron supplements and iron rich diet. Lymphocytes are slightly elevated on CBC as well     Pt does have UTI and awaiting urine culture. Proceed with taking abx     Vitamin b12 stable     CMP shows stable kidney and liver function    Lipid panel stable    Hga1c stable at 5.60 from 6.00 pt is currently not prediabetic    Will update pt with urine culture restuls

## 2023-04-19 NOTE — TELEPHONE ENCOUNTER
Gave pt results and recommendations. She voiced understanding and is agreeable. She does have heavy menstrual cycles and will call OB/GYN for appointment.    Pt is requesting something be sent in for constipation as the iron supplement makes her very constipated.     Pt is requested something for a yeast infection due to bactrim be sent to the pharmacy.

## 2023-04-20 RX ORDER — FERROUS SULFATE 324(65)MG
324 TABLET, DELAYED RELEASE (ENTERIC COATED) ORAL 2 TIMES DAILY
Qty: 60 TABLET | Refills: 3 | Status: SHIPPED | OUTPATIENT
Start: 2023-04-20

## 2023-04-20 RX ORDER — FOLIC ACID 1 MG/1
1000 TABLET ORAL DAILY
Qty: 90 TABLET | Refills: 1 | Status: SHIPPED | OUTPATIENT
Start: 2023-04-20
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
## 2023-04-26 PROBLEM — N30.00 ACUTE CYSTITIS WITHOUT HEMATURIA: Status: ACTIVE | Noted: 2023-04-26

## 2023-05-03 ENCOUNTER — OFFICE VISIT (OUTPATIENT)
Dept: FAMILY MEDICINE CLINIC | Facility: CLINIC | Age: 28
End: 2023-05-03
Payer: COMMERCIAL

## 2023-05-03 VITALS
WEIGHT: 270.4 LBS | DIASTOLIC BLOOD PRESSURE: 62 MMHG | HEIGHT: 66 IN | HEART RATE: 72 BPM | TEMPERATURE: 98.2 F | BODY MASS INDEX: 43.46 KG/M2 | SYSTOLIC BLOOD PRESSURE: 122 MMHG | OXYGEN SATURATION: 98 %

## 2023-05-03 DIAGNOSIS — K04.7 DENTAL ABSCESS: ICD-10-CM

## 2023-05-03 DIAGNOSIS — N30.00 ACUTE CYSTITIS WITHOUT HEMATURIA: ICD-10-CM

## 2023-05-03 DIAGNOSIS — E55.9 VITAMIN D DEFICIENCY: Chronic | ICD-10-CM

## 2023-05-03 DIAGNOSIS — Z87.898 HISTORY OF PREDIABETES: ICD-10-CM

## 2023-05-03 DIAGNOSIS — E53.8 VITAMIN B12 DEFICIENCY: ICD-10-CM

## 2023-05-03 DIAGNOSIS — D50.9 IRON DEFICIENCY ANEMIA, UNSPECIFIED IRON DEFICIENCY ANEMIA TYPE: Chronic | ICD-10-CM

## 2023-05-03 DIAGNOSIS — Z71.3 WEIGHT LOSS COUNSELING, ENCOUNTER FOR: ICD-10-CM

## 2023-05-03 DIAGNOSIS — E66.01 MORBID (SEVERE) OBESITY DUE TO EXCESS CALORIES: Primary | ICD-10-CM

## 2023-05-03 RX ORDER — CHLORHEXIDINE GLUCONATE 0.12 MG/ML
15 RINSE ORAL 2 TIMES DAILY
Qty: 480 ML | Refills: 0 | Status: SHIPPED | OUTPATIENT
Start: 2023-05-03

## 2023-05-03 RX ORDER — PHENTERMINE HYDROCHLORIDE 37.5 MG/1
37.5 TABLET ORAL EVERY MORNING
Qty: 14 TABLET | Refills: 0 | Status: SHIPPED | OUTPATIENT
Start: 2023-05-03

## 2023-05-03 RX ORDER — ERGOCALCIFEROL 1.25 MG/1
50000 CAPSULE ORAL WEEKLY
Qty: 5 CAPSULE | Refills: 3 | Status: SHIPPED | OUTPATIENT
Start: 2023-05-03 | End: 2023-05-03 | Stop reason: SDUPTHER

## 2023-05-03 RX ORDER — LANOLIN ALCOHOL/MO/W.PET/CERES
1000 CREAM (GRAM) TOPICAL DAILY
Qty: 30 TABLET | Refills: 3 | Status: SHIPPED | OUTPATIENT
Start: 2023-05-03

## 2023-05-03 RX ORDER — ERGOCALCIFEROL 1.25 MG/1
50000 CAPSULE ORAL WEEKLY
Qty: 5 CAPSULE | Refills: 3 | Status: SHIPPED | OUTPATIENT
Start: 2023-05-03

## 2023-05-03 NOTE — PATIENT INSTRUCTIONS
Call weight loss surgery  for an appt    Continue phentermine for 14 days      Echocardiogram     Recheck in 1 months

## 2024-07-29 NOTE — TELEPHONE ENCOUNTER
Gave pt results and recommendations. She voiced understandinga nd is agreeable to medication and PT/OT.   I recommend RSV vaccine and Shingrix at the pharmacy.